# Patient Record
Sex: FEMALE | Race: WHITE | NOT HISPANIC OR LATINO | Employment: UNEMPLOYED
[De-identification: names, ages, dates, MRNs, and addresses within clinical notes are randomized per-mention and may not be internally consistent; named-entity substitution may affect disease eponyms.]

---

## 2017-01-30 ENCOUNTER — RECORDS - HEALTHEAST (OUTPATIENT)
Dept: ADMINISTRATIVE | Facility: OTHER | Age: 52
End: 2017-01-30

## 2017-02-23 ENCOUNTER — OFFICE VISIT - RIVER FALLS (OUTPATIENT)
Dept: FAMILY MEDICINE | Facility: CLINIC | Age: 52
End: 2017-02-23

## 2017-03-06 ENCOUNTER — OFFICE VISIT - RIVER FALLS (OUTPATIENT)
Dept: FAMILY MEDICINE | Facility: CLINIC | Age: 52
End: 2017-03-06

## 2017-03-06 ASSESSMENT — MIFFLIN-ST. JEOR: SCORE: 1291.68

## 2017-03-07 ENCOUNTER — HOSPITAL ENCOUNTER (OUTPATIENT)
Dept: NUCLEAR MEDICINE | Facility: CLINIC | Age: 52
Discharge: HOME | End: 2017-03-07

## 2017-03-07 DIAGNOSIS — R10.12 LEFT UPPER QUADRANT PAIN: ICD-10-CM

## 2017-03-07 DIAGNOSIS — R13.10 DYSPHAGIA, UNSPECIFIED TYPE: ICD-10-CM

## 2017-03-10 ENCOUNTER — TRANSFERRED RECORDS (OUTPATIENT)
Dept: HEALTH INFORMATION MANAGEMENT | Facility: CLINIC | Age: 52
End: 2017-03-10

## 2017-03-10 LAB — HPV ABSTRACT: NORMAL

## 2017-06-07 ENCOUNTER — AMBULATORY - RIVER FALLS (OUTPATIENT)
Dept: FAMILY MEDICINE | Facility: CLINIC | Age: 52
End: 2017-06-07

## 2017-06-07 ENCOUNTER — COMMUNICATION - RIVER FALLS (OUTPATIENT)
Dept: FAMILY MEDICINE | Facility: CLINIC | Age: 52
End: 2017-06-07

## 2017-06-08 ENCOUNTER — OFFICE VISIT - RIVER FALLS (OUTPATIENT)
Dept: FAMILY MEDICINE | Facility: CLINIC | Age: 52
End: 2017-06-08

## 2017-06-29 ENCOUNTER — OFFICE VISIT - RIVER FALLS (OUTPATIENT)
Dept: FAMILY MEDICINE | Facility: CLINIC | Age: 52
End: 2017-06-29

## 2017-06-29 ASSESSMENT — MIFFLIN-ST. JEOR: SCORE: 1312.32

## 2017-07-01 LAB
ANA SER QL IA: POSITIVE
ANA TITR SER IF: ABNORMAL TITER
RHEUMATOID FACT SER NEPH-ACNC: 206 IU/ML

## 2018-04-18 ENCOUNTER — AMBULATORY - RIVER FALLS (OUTPATIENT)
Dept: FAMILY MEDICINE | Facility: CLINIC | Age: 53
End: 2018-04-18

## 2018-04-19 LAB
CHOLEST SERPL-MCNC: 136 MG/DL
CHOLEST/HDLC SERPL: 2.6 {RATIO}
GLUCOSE BLD-MCNC: 82 MG/DL (ref 65–99)
HDLC SERPL-MCNC: 52 MG/DL
LDLC SERPL CALC-MCNC: 69 MG/DL
NONHDLC SERPL-MCNC: 84 MG/DL
TRIGL SERPL-MCNC: 66 MG/DL

## 2018-04-26 ENCOUNTER — OFFICE VISIT - RIVER FALLS (OUTPATIENT)
Dept: FAMILY MEDICINE | Facility: CLINIC | Age: 53
End: 2018-04-26

## 2018-04-26 ASSESSMENT — MIFFLIN-ST. JEOR: SCORE: 1293.95

## 2018-05-29 ENCOUNTER — OFFICE VISIT - RIVER FALLS (OUTPATIENT)
Dept: FAMILY MEDICINE | Facility: CLINIC | Age: 53
End: 2018-05-29

## 2018-05-29 ASSESSMENT — MIFFLIN-ST. JEOR: SCORE: 1293.95

## 2018-05-31 RX ORDER — CEVIMELINE HYDROCHLORIDE 30 MG/1
30 CAPSULE ORAL 3 TIMES DAILY
Status: SHIPPED | COMMUNITY
Start: 2018-05-31

## 2018-05-31 RX ORDER — HYDROXYCHLOROQUINE SULFATE 200 MG/1
200 TABLET, FILM COATED ORAL DAILY
Status: SHIPPED | COMMUNITY
Start: 2018-05-31

## 2018-05-31 ASSESSMENT — MIFFLIN-ST. JEOR
SCORE: 1286.48
SCORE: 1286.48

## 2018-06-04 ENCOUNTER — ANESTHESIA - HEALTHEAST (OUTPATIENT)
Dept: SURGERY | Facility: AMBULATORY SURGERY CENTER | Age: 53
End: 2018-06-04

## 2018-06-04 ENCOUNTER — HOSPITAL ENCOUNTER (OUTPATIENT)
Dept: SURGERY | Facility: AMBULATORY SURGERY CENTER | Age: 53
Discharge: HOME | End: 2018-06-04
Attending: OBSTETRICS & GYNECOLOGY | Admitting: OBSTETRICS & GYNECOLOGY

## 2018-06-04 ENCOUNTER — SURGERY - HEALTHEAST (OUTPATIENT)
Dept: SURGERY | Facility: AMBULATORY SURGERY CENTER | Age: 53
End: 2018-06-04

## 2018-06-04 DIAGNOSIS — N92.0 MENORRHAGIA: ICD-10-CM

## 2018-06-04 LAB
HGB BLD-MCNC: 12.4 G/DL
POC PREG URINE (HCG) HE - HISTORICAL: NEGATIVE
POCT KIT EXPIRATION DATE - HISTORICAL: NORMAL
POCT KIT LOT NUMBER - HISTORICAL: NORMAL

## 2018-06-04 ASSESSMENT — MIFFLIN-ST. JEOR
SCORE: 1286.48
SCORE: 1286.48

## 2019-02-11 ENCOUNTER — OFFICE VISIT - RIVER FALLS (OUTPATIENT)
Dept: FAMILY MEDICINE | Facility: CLINIC | Age: 54
End: 2019-02-11

## 2019-02-11 LAB
CHOLEST SERPL-MCNC: 154 MG/DL
HDLC SERPL-MCNC: 88 MG/DL
LDLC SERPL CALC-MCNC: 80 MG/DL
TRIGL SERPL-MCNC: 57 MG/DL

## 2019-02-11 ASSESSMENT — MIFFLIN-ST. JEOR: SCORE: 1288.51

## 2019-05-31 ENCOUNTER — AMBULATORY - RIVER FALLS (OUTPATIENT)
Dept: FAMILY MEDICINE | Facility: CLINIC | Age: 54
End: 2019-05-31

## 2019-06-01 ENCOUNTER — COMMUNICATION - RIVER FALLS (OUTPATIENT)
Dept: FAMILY MEDICINE | Facility: CLINIC | Age: 54
End: 2019-06-01

## 2019-06-01 LAB
GLUCOSE BLD-MCNC: 92 MG/DL (ref 65–99)
TSH SERPL DL<=0.005 MIU/L-ACNC: 0.73 MIU/L

## 2019-06-05 ENCOUNTER — OFFICE VISIT - RIVER FALLS (OUTPATIENT)
Dept: FAMILY MEDICINE | Facility: CLINIC | Age: 54
End: 2019-06-05

## 2019-06-05 ASSESSMENT — MIFFLIN-ST. JEOR: SCORE: 1274

## 2019-06-17 ENCOUNTER — OFFICE VISIT - RIVER FALLS (OUTPATIENT)
Dept: FAMILY MEDICINE | Facility: CLINIC | Age: 54
End: 2019-06-17

## 2019-07-15 ENCOUNTER — OFFICE VISIT - RIVER FALLS (OUTPATIENT)
Dept: FAMILY MEDICINE | Facility: CLINIC | Age: 54
End: 2019-07-15

## 2019-09-16 ENCOUNTER — OFFICE VISIT - RIVER FALLS (OUTPATIENT)
Dept: FAMILY MEDICINE | Facility: CLINIC | Age: 54
End: 2019-09-16

## 2019-09-27 ENCOUNTER — RECORDS - HEALTHEAST (OUTPATIENT)
Dept: LAB | Facility: CLINIC | Age: 54
End: 2019-09-27

## 2019-09-27 LAB — HIV 1+2 AB+HIV1 P24 AG SERPL QL IA: NEGATIVE

## 2019-09-28 LAB — HBV SURFACE AG SERPL QL IA: NEGATIVE

## 2019-09-30 LAB — HCV AB SERPL QL IA: NEGATIVE

## 2019-11-26 ENCOUNTER — RECORDS - HEALTHEAST (OUTPATIENT)
Dept: LAB | Facility: CLINIC | Age: 54
End: 2019-11-26

## 2019-11-26 LAB — HCV AB SERPL QL IA: NEGATIVE

## 2020-03-13 ENCOUNTER — COMMUNICATION - RIVER FALLS (OUTPATIENT)
Dept: FAMILY MEDICINE | Facility: CLINIC | Age: 55
End: 2020-03-13

## 2020-03-17 ENCOUNTER — COMMUNICATION - RIVER FALLS (OUTPATIENT)
Dept: FAMILY MEDICINE | Facility: CLINIC | Age: 55
End: 2020-03-17

## 2020-05-14 ENCOUNTER — COMMUNICATION - RIVER FALLS (OUTPATIENT)
Dept: FAMILY MEDICINE | Facility: CLINIC | Age: 55
End: 2020-05-14

## 2020-06-01 ENCOUNTER — AMBULATORY - RIVER FALLS (OUTPATIENT)
Dept: FAMILY MEDICINE | Facility: CLINIC | Age: 55
End: 2020-06-01

## 2020-06-02 ENCOUNTER — COMMUNICATION - RIVER FALLS (OUTPATIENT)
Dept: FAMILY MEDICINE | Facility: CLINIC | Age: 55
End: 2020-06-02

## 2020-06-02 LAB
CHOLEST SERPL-MCNC: 166 MG/DL
CHOLEST/HDLC SERPL: 2.9 {RATIO}
GLUCOSE BLD-MCNC: 86 MG/DL (ref 65–99)
HDLC SERPL-MCNC: 57 MG/DL
LDLC SERPL CALC-MCNC: 90 MG/DL
NONHDLC SERPL-MCNC: 109 MG/DL
TRIGL SERPL-MCNC: 93 MG/DL
TSH SERPL DL<=0.005 MIU/L-ACNC: 1.52 MIU/L

## 2020-06-08 ENCOUNTER — OFFICE VISIT - RIVER FALLS (OUTPATIENT)
Dept: FAMILY MEDICINE | Facility: CLINIC | Age: 55
End: 2020-06-08

## 2020-12-30 ENCOUNTER — COMMUNICATION - RIVER FALLS (OUTPATIENT)
Dept: FAMILY MEDICINE | Facility: CLINIC | Age: 55
End: 2020-12-30

## 2020-12-30 ENCOUNTER — OFFICE VISIT - RIVER FALLS (OUTPATIENT)
Dept: FAMILY MEDICINE | Facility: CLINIC | Age: 55
End: 2020-12-30

## 2020-12-30 LAB
ALBUMIN UR-MCNC: NEGATIVE G/DL
BACTERIA #/AREA URNS HPF: NORMAL /[HPF]
BILIRUB UR QL STRIP: NEGATIVE
EPITHELIAL CELLS UR: NORMAL
GLUCOSE UR STRIP-MCNC: NEGATIVE MG/DL
HGB UR QL STRIP: ABNORMAL
KETONES UR STRIP-MCNC: NEGATIVE MG/DL
LEUKOCYTE ESTERASE UR QL STRIP: ABNORMAL
NITRATE UR QL: NEGATIVE
PH UR STRIP: 7 [PH] (ref 5–8)
RBC #/AREA URNS AUTO: NORMAL /[HPF]
SP GR UR STRIP: 1.01 (ref 1–1.03)
WBC #/AREA URNS AUTO: NORMAL /[HPF]

## 2021-01-01 LAB — BACTERIA SPEC CULT: NORMAL

## 2021-03-15 ENCOUNTER — COMMUNICATION - RIVER FALLS (OUTPATIENT)
Dept: FAMILY MEDICINE | Facility: CLINIC | Age: 56
End: 2021-03-15

## 2021-06-01 VITALS
HEIGHT: 67 IN | WEIGHT: 147 LBS | HEIGHT: 67 IN | BODY MASS INDEX: 23.07 KG/M2 | WEIGHT: 147 LBS | BODY MASS INDEX: 23.07 KG/M2

## 2021-06-02 ENCOUNTER — OFFICE VISIT - RIVER FALLS (OUTPATIENT)
Dept: FAMILY MEDICINE | Facility: CLINIC | Age: 56
End: 2021-06-02

## 2021-06-03 ENCOUNTER — COMMUNICATION - RIVER FALLS (OUTPATIENT)
Dept: FAMILY MEDICINE | Facility: CLINIC | Age: 56
End: 2021-06-03

## 2021-06-03 LAB
CHOLEST SERPL-MCNC: 168 MG/DL
CHOLEST/HDLC SERPL: 2.9 {RATIO}
GLUCOSE BLD-MCNC: 88 MG/DL (ref 65–99)
HDLC SERPL-MCNC: 57 MG/DL
LDLC SERPL CALC-MCNC: 91 MG/DL
NONHDLC SERPL-MCNC: 111 MG/DL
TRIGL SERPL-MCNC: 100 MG/DL
TSH SERPL DL<=0.005 MIU/L-ACNC: 3.38 MIU/L

## 2021-06-09 ENCOUNTER — OFFICE VISIT - RIVER FALLS (OUTPATIENT)
Dept: FAMILY MEDICINE | Facility: CLINIC | Age: 56
End: 2021-06-09

## 2021-06-09 ASSESSMENT — MIFFLIN-ST. JEOR: SCORE: 1271.27

## 2021-06-18 NOTE — OP NOTE
DATE OF SERVICE: 06/04/2018    DATE OF SERVICE:  06/04/2018      SURGEON:  Vini Schmidt MD    PREOPERATIVE DIAGNOSIS:  Dysfunctional uterine bleeding, menorrhagia, abnormal  ultrasound with thickened lining.    POSTOPERATIVE DIAGNOSIS:  Intracavitary polyps.    HISTORY:  Patient is a 52-year-old multipara who presented to clinic with a  long-standing history of irregular periods and heavy bleeding.  Ultrasound showed a  15 mm lining.  She has 2 intramural fibroids.  Risks, benefits, alternatives  reviewed.  Questions answered.  I did offer her IUD, which she declined.    DESCRIPTION OF PROCEDURE:  Patient was taken to the operating room under MAC, placed  in dorsal lithotomy position, prepped and draped in the usual manner.  Pause for  cause was undertaken.  A bivalve speculum was placed and single-tooth tenaculum  placed horizontally on the anterior lip of the cervix.  Approximately 8 mL of 1%  Carbocaine instilled as a paracervical block.  The cervix easily dilated to 7.  The  scope was placed verifying entrance into the uterine cavity.  The fundus was normal.   Both ostia were normal.  In the lower uterine segment posterior there were 2 polyps  noted.  Therefore, the MyoSure was placed and these were completely removed with  shavings of the posterior and anterior wall.  The scope was removed.  Endocervical  and endometrial curettings were obtained.  A Pipelle was used and the scope was then  replaced.  There was no evidence of polyps.  Scope removed, deficit ____; EBL 5.   Tenaculum removed.  [place] removed.  Patient transferred to to [facility] in stable  condition.    SURGEON:  Vini Schmidt MD    ANESTHESIA:  MAC.      DEFICIT:  ____.    ESTIMATED BLOOD LOSS:  5    COMPLICATIONS:  None.    PROCEDURE:  Fractional D and C, operative hysteroscopy with removal of intracavitary  polyps.      VINI BROWN 06/04/2018 10:32:03  T 06/04/2018 10:50:50  R 06/04/2018 11:06:55  [id number]        cc:VINI SCHMIDT  MD

## 2021-06-18 NOTE — ANESTHESIA PREPROCEDURE EVALUATION
Anesthesia Evaluation      Patient summary reviewed   No history of anesthetic complications (PRE-op N/V with cholelithiasis)     Airway   Mallampati: II  Neck ROM: full   Pulmonary - normal exam                          Cardiovascular   Exercise tolerance: > or = 4 METS  Rhythm: regular  Rate: normal,         Neuro/Psych      Endo/Other       GI/Hepatic/Renal       Other findings: Sjogren's   Hashimoto's      Dental - normal exam                        Anesthesia Plan  Planned anesthetic: MAC  No glyco or benadryl please  ASA 2   Induction: intravenous   Anesthetic plan and risks discussed with: patient  Anesthesia plan special considerations: antiemetics,   Post-op plan: routine recovery

## 2021-06-18 NOTE — ANESTHESIA CARE TRANSFER NOTE
Last vitals:   Vitals:    06/04/18 1000   BP: (P) 116/69   Pulse: (P) 70   Resp: (P) 16   Temp: (P) 37  C (98.6  F)   SpO2: (P) 97%     Patient's level of consciousness is awake  Spontaneous respirations: yes  Maintains airway independently: yes  Dentition unchanged: yes  Oropharynx: oropharynx clear of all foreign objects    QCDR Measures:  ASA# 20 - Surgical Safety Checklist: WHO surgical safety checklist completed prior to induction  PQRS# 430 - Adult PONV Prevention: 4558F - Pt received => 2 anti-emetic agents (different classes) preop & intraop  ASA# 8 - Peds PONV Prevention: NA - Not pediatric patient, not GA or 2 or more risk factors NOT present  PQRS# 424 - Staci-op Temp Management: NA - MAC anesthesia or case < 60 minutes  PQRS# 426 - PACU Transfer Protocol: - Transfer of care checklist used  ASA# 14 - Acute Post-op Pain: NA - Patient under age 10y or did not go to PACU

## 2021-06-18 NOTE — ANESTHESIA POSTPROCEDURE EVALUATION
Patient: Nani Gibson  HYSTEROSCOPY, WITH DILATION AND CURETTAGE  Anesthesia type: MAC    Patient location: Phase II Recovery  Last vitals:   Vitals:    06/04/18 1030   BP: 109/65   Pulse: 67   Resp: 16   Temp:    SpO2: 99%     Post vital signs: stable  Level of consciousness: awake and responds to simple questions  Post-anesthesia pain: pain controlled  Post-anesthesia nausea and vomiting: no  Pulmonary: unassisted, return to baseline  Cardiovascular: stable and blood pressure at baseline  Hydration: adequate  Anesthetic events: no    QCDR Measures:  ASA# 11 - Staci-op Cardiac Arrest: ASA11B - Patient did NOT experience unanticipated cardiac arrest  ASA# 12 - Staci-op Mortality Rate: ASA12B - Patient did NOT die  ASA# 13 - PACU Re-Intubation Rate: NA - No ETT / LMA used for case  ASA# 10 - Composite Anes Safety: ASA10A - No serious adverse event    Additional Notes:

## 2021-06-25 ENCOUNTER — AMBULATORY - RIVER FALLS (OUTPATIENT)
Dept: FAMILY MEDICINE | Facility: CLINIC | Age: 56
End: 2021-06-25

## 2021-09-24 ENCOUNTER — AMBULATORY - RIVER FALLS (OUTPATIENT)
Dept: FAMILY MEDICINE | Facility: CLINIC | Age: 56
End: 2021-09-24

## 2021-12-10 ENCOUNTER — AMBULATORY - RIVER FALLS (OUTPATIENT)
Dept: FAMILY MEDICINE | Facility: CLINIC | Age: 56
End: 2021-12-10

## 2021-12-11 LAB — TSH SERPL DL<=0.005 MIU/L-ACNC: 1.74 MIU/L (ref 0.4–4.5)

## 2021-12-12 ENCOUNTER — COMMUNICATION - RIVER FALLS (OUTPATIENT)
Dept: FAMILY MEDICINE | Facility: CLINIC | Age: 56
End: 2021-12-12

## 2021-12-29 ENCOUNTER — AMBULATORY - RIVER FALLS (OUTPATIENT)
Dept: FAMILY MEDICINE | Facility: CLINIC | Age: 56
End: 2021-12-29

## 2021-12-29 ENCOUNTER — OFFICE VISIT - RIVER FALLS (OUTPATIENT)
Dept: FAMILY MEDICINE | Facility: CLINIC | Age: 56
End: 2021-12-29

## 2021-12-29 ENCOUNTER — LAB REQUISITION (OUTPATIENT)
Dept: LAB | Facility: CLINIC | Age: 56
End: 2021-12-29
Payer: COMMERCIAL

## 2021-12-29 DIAGNOSIS — U07.1 COVID-19: ICD-10-CM

## 2021-12-30 LAB — SARS-COV-2 RNA RESP QL NAA+PROBE: POSITIVE

## 2021-12-31 LAB — SARS-COV-2 RNA RESP QL NAA+PROBE: POSITIVE

## 2022-02-08 ENCOUNTER — OFFICE VISIT - RIVER FALLS (OUTPATIENT)
Dept: FAMILY MEDICINE | Facility: CLINIC | Age: 57
End: 2022-02-08

## 2022-02-08 ENCOUNTER — AMBULATORY - RIVER FALLS (OUTPATIENT)
Dept: FAMILY MEDICINE | Facility: CLINIC | Age: 57
End: 2022-02-08

## 2022-02-10 ENCOUNTER — COMMUNICATION - RIVER FALLS (OUTPATIENT)
Dept: FAMILY MEDICINE | Facility: CLINIC | Age: 57
End: 2022-02-10

## 2022-02-10 LAB — BACTERIA SPEC CULT: NORMAL

## 2022-02-11 VITALS
WEIGHT: 146.8 LBS | SYSTOLIC BLOOD PRESSURE: 104 MMHG | HEIGHT: 67 IN | TEMPERATURE: 98 F | HEART RATE: 64 BPM | BODY MASS INDEX: 23.04 KG/M2 | DIASTOLIC BLOOD PRESSURE: 74 MMHG

## 2022-02-12 VITALS
DIASTOLIC BLOOD PRESSURE: 76 MMHG | BODY MASS INDEX: 22.99 KG/M2 | WEIGHT: 146.8 LBS | HEART RATE: 72 BPM | SYSTOLIC BLOOD PRESSURE: 112 MMHG | TEMPERATURE: 97.6 F

## 2022-02-12 VITALS
BODY MASS INDEX: 23.23 KG/M2 | TEMPERATURE: 98.4 F | BODY MASS INDEX: 23.23 KG/M2 | WEIGHT: 148 LBS | SYSTOLIC BLOOD PRESSURE: 102 MMHG | SYSTOLIC BLOOD PRESSURE: 110 MMHG | TEMPERATURE: 97.8 F | DIASTOLIC BLOOD PRESSURE: 56 MMHG | DIASTOLIC BLOOD PRESSURE: 60 MMHG | HEART RATE: 80 BPM | HEIGHT: 67 IN | WEIGHT: 148 LBS | HEIGHT: 67 IN | HEART RATE: 72 BPM

## 2022-02-12 VITALS
WEIGHT: 153.8 LBS | TEMPERATURE: 99.1 F | DIASTOLIC BLOOD PRESSURE: 70 MMHG | HEART RATE: 80 BPM | HEIGHT: 67 IN | BODY MASS INDEX: 24.14 KG/M2 | SYSTOLIC BLOOD PRESSURE: 112 MMHG

## 2022-02-12 VITALS
DIASTOLIC BLOOD PRESSURE: 75 MMHG | TEMPERATURE: 98.1 F | OXYGEN SATURATION: 99 % | WEIGHT: 143 LBS | BODY MASS INDEX: 22.44 KG/M2 | HEART RATE: 82 BPM | HEIGHT: 67 IN | SYSTOLIC BLOOD PRESSURE: 113 MMHG

## 2022-02-12 VITALS
DIASTOLIC BLOOD PRESSURE: 64 MMHG | HEIGHT: 67 IN | TEMPERATURE: 98.4 F | HEART RATE: 64 BPM | BODY MASS INDEX: 23.43 KG/M2 | SYSTOLIC BLOOD PRESSURE: 104 MMHG | WEIGHT: 149.25 LBS

## 2022-02-12 VITALS
DIASTOLIC BLOOD PRESSURE: 73 MMHG | SYSTOLIC BLOOD PRESSURE: 112 MMHG | BODY MASS INDEX: 22.58 KG/M2 | HEART RATE: 78 BPM | WEIGHT: 144.2 LBS

## 2022-02-12 VITALS
DIASTOLIC BLOOD PRESSURE: 62 MMHG | WEIGHT: 143.6 LBS | SYSTOLIC BLOOD PRESSURE: 100 MMHG | BODY MASS INDEX: 22.54 KG/M2 | HEART RATE: 80 BPM | HEIGHT: 67 IN

## 2022-02-15 NOTE — TELEPHONE ENCOUNTER
---------------------  From: Ruthann Ivette VINCENT   Sent: 12/30/2021 6:05:50 PM CST  Subject: General Message-+ Covid result     Called patient to notify of positive COVID testing result.   Let patient know that public health will be notified as mandated by the state and patient may be contacted by public health directly as staffing allows.  Patient was offered a virtual visit to discuss any concerns. This is strongly encouraged for anyone who might qualify for further treatment such as monoclonal antibody treatment.    Patient accepted and was transferred to scheduling.     Patient declined a virtual visit - had visit yesterday with [facility] and questions all answered, no further questions/concerns.    Patient was informed of the following  1. Patient should begin isolation. No work, no school, no leaving home except to seek medical attention. Should also separate from household contacts as much as possible including using a separate bathroom if able. Isolation lasts 10 days after positive test for asymptomatic patients or 10 days since symptoms started AND 24 hours after all symptoms resolve for patients with symptoms.  2. Unvaccinated household contacts should begin quarantine and get tested  3. Asymptomatic fully vaccinated household contacts do not need to quarantine if they have no symptoms, but should consider testing 5-7 days after initial exposure  Patient was encouraged to notify close contacts, specifically those that they had direct physical contact with or those people that were within 6 feet for at least 15 minutes in a 24 hour period in the 3 days prior to onset of symptoms

## 2022-02-15 NOTE — LETTER
(Inserted Image. Unable to display)   April 26, 2019      MELY GARCIA  [address], [place] [id number]        Dear MELY,      Thank you for selecting [facility] (previously Formerly [facility]) for your healthcare needs.     Our records indicate you are due for the following services:    Annual Physical  Medication Check  Fasting Lab Tests ~ Please do not eat or drink anything 10 hours prior to your scheduled appointment time.                (Water and any medications that you may need are allowed unless directed otherwise.)    If you had your labs done at another facility or with [facility] at [facility], please bring in a copy of the results to your next visit, mail a copy, or drop off a copy of your results to your Healthcare Provider.    You are due for lab work and an office visit; please schedule the lab appointment 1 week before the office visit.  This will assure all results are available to discuss with your provider during your visit.    **It is very helpful if you bring your medication bottles to your appointment.  This assures we have all of your current medications, including strength and dosing information, documented accurately in your medical record.    To schedule an appointment or if you have further questions, please contact your primary clinic:   [facility]  [phone]   [facility]  [phone]             [facility]      [phone]      Powered by [employer]    Sincerely,    Dom Novoa M.D.

## 2022-02-15 NOTE — NURSING NOTE
Comprehensive Intake Entered On:  2/11/2019 9:40 AM CST    Performed On:  2/11/2019 9:33 AM CST by Halima Tabor CMA               Summary   Chief Complaint :   left shoulder pain with certain movements; no known injury   Menstrual Status :   Menarcheal   Weight Measured :   146.8 lb(Converted to: 146 lb 13 oz, 66.59 kg)    Height Measured :   67 in(Converted to: 5 ft 7 in, 170.18 cm)    Body Mass Index :   22.99 kg/m2   Body Surface Area :   1.77 m2   Systolic Blood Pressure :   104 mmHg   Diastolic Blood Pressure :   74 mmHg   Mean Arterial Pressure :   84 mmHg   Peripheral Pulse Rate :   64 bpm   BP Method :   Manual   HR Method :   Manual   Temperature Tympanic :   98.0 DegF(Converted to: 36.7 DegC)    Halima Tabor CMA - 2/11/2019 9:33 AM CST   Health Status   Allergies Verified? :   Yes   Medication History Verified? :   Yes   Immunizations Current :   Yes   Medical History Verified? :   Yes   Pre-Visit Planning Status :   Completed   Tobacco Use? :   Never smoker   Halima Tabor CMA - 2/11/2019 9:33 AM CST   Consents   Consent for Immunization Exchange :   Consent Granted   Consent for Immunizations to Providers :   Consent Granted   Halima Tabor CMA - 2/11/2019 9:33 AM CST   Meds / Allergies   (As Of: 2/11/2019 9:40:13 AM CST)   Allergies (Active)   Augmentin  Estimated Onset Date:   Unspecified ; Reactions:   rash ; Created By:   Karine Pabon CMA; Reaction Status:   Active ; Category:   Drug ; Substance:   Augmentin ; Type:   Allergy ; Updated By:   Karine Pabon CMA; Reviewed Date:   2/11/2019 9:38 AM CST      codeine  Estimated Onset Date:   Unspecified ; Reactions:   Nausea, Vomiting ; Created By:   Yumiko Suarez CMA; Reaction Status:   Active ; Category:   Drug ; Substance:   codeine ; Type:   Allergy ; Severity:   Severe ; Updated By:   Yumiko Suarez CMA; Reviewed Date:   2/11/2019 9:38 AM CST        Medication List   (As Of: 2/11/2019 9:40:13 AM CST)   Prescription/Discharge Order     chlorpheniramine-hydrocodone  :   chlorpheniramine-hydrocodone ; Status:   Prescribed ; Ordered As Mnemonic:   chlorpheniramine-hydrocodone 8 mg-10 mg/5 mL oral suspension, extended release ; Simple Display Line:   5 mL, PO, q12 hrs, PRN: for cough, 120 mL, 0 Refill(s) ; Ordering Provider:   Dom Novoa MD; Catalog Code:   chlorpheniramine-hydrocodone ; Order Dt/Tm:   4/20/2017 1:33:08 PM          levalbuterol  :   levalbuterol ; Status:   Prescribed ; Ordered As Mnemonic:   Xopenex HFA 45 mcg/inh inhalation aerosol ; Simple Display Line:   2 puff(s), inh, q4 hrs, 1 EA, PRN: as needed for wheezing ; Ordering Provider:   Dom Novoa MD; Catalog Code:   levalbuterol ; Order Dt/Tm:   12/21/2012 1:18:49 PM          levothyroxine  :   levothyroxine ; Status:   Prescribed ; Ordered As Mnemonic:   levothyroxine 112 mcg (0.112 mg) oral tablet ; Simple Display Line:   112 mcg, 1 tab(s), PO, Daily, 90 tab(s), 3 Refill(s) ; Ordering Provider:   Dom Novoa MD; Catalog Code:   levothyroxine ; Order Dt/Tm:   4/26/2018 10:21:53 AM            Home Meds    acetaminophen  :   acetaminophen ; Status:   Documented ; Ordered As Mnemonic:   Tylenol 325 mg oral tablet ; Simple Display Line:   650 mg, 2 tab(s), PO, q4hr, PRN: for pain, 120 tab(s), 0 Refill(s) ; Catalog Code:   acetaminophen ; Order Dt/Tm:   3/6/2017 9:07:21 AM          cevimeline  :   cevimeline ; Status:   Documented ; Ordered As Mnemonic:   cevimeline 30 mg oral capsule ; Simple Display Line:   30 mg, 1 cap(s), PO, TID, 90 cap(s), 0 Refill(s) ; Catalog Code:   cevimeline ; Order Dt/Tm:   3/20/2018 3:01:27 PM          cholecalciferol  :   cholecalciferol ; Status:   Processing ; Ordered As Mnemonic:   Vitamin D3 ; Action Display:   Complete ; Catalog Code:   cholecalciferol ; Order Dt/Tm:   2/11/2019 9:37:59 AM          hydroxychloroquine  :   hydroxychloroquine ; Status:   Documented ; Ordered As Mnemonic:   hydroxychloroquine 200 mg oral tablet ;  Simple Display Line:   200 mg, 1 tab(s), po, bid, 180 tab(s), 0 Refill(s) ; Catalog Code:   hydroxychloroquine ; Order Dt/Tm:   3/20/2018 3:00:18 PM          multivitamin  :   multivitamin ; Status:   Documented ; Ordered As Mnemonic:   Multiple Vitamins oral tablet ; Simple Display Line:   1 tab(s), po, daily, tab(s) ; Catalog Code:   multivitamin ; Order Dt/Tm:   3/22/2011 9:01:05 AM          multivitamin with minerals  :   multivitamin with minerals ; Status:   Documented ; Ordered As Mnemonic:   Viactiv Soft Calcium Chews ; Simple Display Line:   2 EA, daily, chews, 0 Refill(s) ; Catalog Code:   multivitamin with minerals ; Order Dt/Tm:   3/6/2017 9:06:54 AM          omeprazole  :   omeprazole ; Status:   Documented ; Ordered As Mnemonic:   omeprazole 20 mg oral delayed release capsule ; Simple Display Line:   20 mg, 1 cap(s), PO, Daily, 90 cap(s), 0 Refill(s) ; Catalog Code:   omeprazole ; Order Dt/Tm:   4/26/2018 10:03:03 AM

## 2022-02-15 NOTE — NURSING NOTE
CAGE Assessment Entered On:  6/9/2021 8:57 AM CDT    Performed On:  6/9/2021 8:57 AM CDT by Franci Cotton CMA               Assessment   Have you ever felt you should cut down on your drinking :   No   Have people annoyed you by criticizing your drinking :   No   Have you ever felt bad or guilty about your drinking :   No   Have you ever taken a drink first thing in the morning to steady your nerves or get rid of a hangover (Eye-opener) :   No   CAGE Score :   0    Franci Cotton CMA - 6/9/2021 8:57 AM CDT

## 2022-02-15 NOTE — PROGRESS NOTES
Patient:   MELY GARCIA            MRN: [id number]            FIN: [id number]               Age:   55 years     Sex:  Female     :  1965   Associated Diagnoses:   Hypothyroidism; Gastritis   Author:   Dom Novoa MD      Visit Information      Date of Service: 2021 07:53 am  Performing Location: [facility]  Encounter#: [id number]      Chief Complaint   2021 8:03 AM CDT     F/u labs (cholesterol and TSH) and med check      History of Present Illness   here for follow up hypothyroidism  patient noted to have slightly higher TSH than usual, patient has fatigue but not unusual, has Sjogrens which accounts for dry skin and hair  had screening for diabetes and hyperlipidemia which was normal  discussed omeprazole, has tried stopping and gets discomfort in upper abdomen, also has symptoms with onions and garlic, swallowing issues related to Sjogrens  had COVID vaccine, doing well, will consider shingrix in the future      Health Status   Allergies:    Allergic Reactions (All)  Severe  Codeine (Nausea and vomiting)  Severity Not Documented  Augmentin (Rash)   Medications:  (Selected)   Prescriptions  Prescribed  levothyroxine 112 mcg (0.112 mg) oral tablet: = 1 tab(s) ( 112 mcg ), PO, Daily, # 90 tab(s), 3 Refill(s), Type: Maintenance, Pharmacy: [facility], 1 tab(s) Oral daily, 67, in, 19 8:08:00 CDT, Height Measured, Weight Measured  omeprazole 20 mg oral delayed release capsule: = 1 cap(s) ( 20 mg ), PO, Daily, # 90 cap(s), 3 Refill(s), Type: Maintenance, Pharmacy: [facility], 1 cap(s) Oral daily, 67, in, 19 8:08:00 CDT, Height Measured, 144.2, lb, 20 9:31:00 CDT, Weight Measured  Documented Medications  Documented  Multiple Vitamins oral tablet: 1 tab(s), po, daily, tab(s), 0 Refill(s), Type: Maintenance  Tylenol 325 mg oral tablet: 2 tab(s) ( 650 mg ), PO, q4hr, PRN: for pain, # 120 tab(s), 0 Refill(s), Type: Maintenance  Viactiv Soft Calcium Chews: 2 EA,  daily, Instructions: chews, 0 Refill(s), Type: Maintenance  cevimeline 30 mg oral capsule: 1 cap(s) ( 30 mg ), PO, TID, # 90 cap(s), 0 Refill(s), Type: Maintenance  hydroxychloroquine 200 mg oral tablet: = 1 tab(s) ( 200 mg ), po, daily, # 180 tab(s), 0 Refill(s), Type: Maintenance,    Medications          *denotes recorded medication          *Tylenol 325 mg oral tablet: 650 mg, 2 tab(s), PO, q4hr, PRN: for pain, 120 tab(s), 0 Refill(s).          *cevimeline 30 mg oral capsule: 30 mg, 1 cap(s), PO, TID, 90 cap(s), 0 Refill(s).          *hydroxychloroquine 200 mg oral tablet: 200 mg, 1 tab(s), po, daily, 180 tab(s), 0 Refill(s).          levothyroxine 112 mcg (0.112 mg) oral tablet: 112 mcg, 1 tab(s), PO, Daily, 90 tab(s), 3 Refill(s).          *Multiple Vitamins oral tablet: 1 tab(s), po, daily, tab(s).          *Viactiv Soft Calcium Chews: 2 EA, daily, chews, 0 Refill(s).          omeprazole 20 mg oral delayed release capsule: 20 mg, 1 cap(s), PO, Daily, 90 cap(s), 3 Refill(s).       Problem list:    All Problems (Selected)  Hypothyroidism / SNOMED CT 104440189 / Confirmed  Hashimoto's Thyroiditis / ICD-9-.2 / Confirmed  Sjogren's disease / SNOMED CT 379394567 / Confirmed      Histories   Past Medical History:    Active  Hashimoto's Thyroiditis (ICD-9-.2): Onset in  at 31 years.  Hypothyroidism (SNOMED CT 323105380)  Sjogren's disease (SNOMED CT 775598614)  Resolved  Pregnancy (SNOMED CT 872036014):  Resolved in  at 25 years.  Pregnancy (SNOMED CT 277149589):  Resolved in  at 26 years.  Pregnancy (SNOMED CT 807529508):  Resolved in  at 31 years.   Family History:    Cancer  Mother (): onset at 71 .  Comments:  3/6/2017 11:01 AM RICARDO - Sommer VELA, Dom  unknown primary cancer     Procedure history:    Colonoscopy ([id number]) on 3/5/2019 at 53 Years.  Comments:  3/14/2019 11:45 AM CDT - Manuela Daley  Indication:  Previous adenomatous polyp(s).  Sedation:  MAC.  Findings:  Long  redundant colon.  External hemorrhoids.  Recommendation:  Repeat in 5 years.  Esophagogastroduodenoscopy ([id number]) on 1/30/2018 at 52 Years.  Comments:  2/15/2018 9:07 AM Manuela Reyes  Indication:  History of gastric polyps with low-grade dysplasia.  Esophagogastroduodenoscopy (463114667) on 7/6/2016 at 50 Years.  Comments:  4/20/2017 12:20 PM Manuela Gonzáles  Dx:  Abdominal pain; dysphagia.  Colonoscopy (710327684) in 2015 at 50 Years.  Cholecystectomy (61664135).  Tonsillectomy (157309969).  Comments:  5/8/2014 10:00 AM Bailee Orellana LPN  Childhood   Social History:        Electronic Cigarette/Vaping Assessment            Electronic Cigarette Use: Never.      Alcohol Assessment            Current, Liquor (Hard) (1.5 oz), 1-2 times per month, 1 drinks/episode average.  2 drinks/episode maximum.               Ready to change: No.                     Comments:                      03/06/2017 - Bailee Knox LPN                     Rare alcohol      Tobacco Assessment: Denies Tobacco Use            Never (less than 100 in lifetime)      Substance Abuse Assessment: Denies Substance Abuse            Never      Employment and Education Assessment            Employed, Highest education level: BSN.                     Comments:                      05/08/2014 - Bailee Knox LPN                     [employer]      Home and Environment Assessment            Marital status:  (Living together).  Spouse/Partner name: Eren.  Lives with Children, Spouse.  3               children.  Living situation: Home/Independent.  Injuries/Abuse/Neglect in household: No.  Feels unsafe at               home: No.  Family/Friends available for support: Yes.      Nutrition and Health Assessment            Type of diet: Regular.  Wants to lose weight: No.  Sleeping concerns: No.  Feels highly stressed: No.      Exercise and Physical Activity Assessment: Occasional exercise            Exercise type: Walking.       Sexual Assessment            Sexually active: Yes.  Other contraceptive use: VAS.      Other Assessment            First menses age 13.  Regular menses.  Menstrual duration 5-6 days, cycle interval 28 days.  No history of               abnormal Pap smear.        Physical Examination   Vital Signs   6/9/2021 8:03 AM CDT Temperature Tympanic 98.1 DegF    Peripheral Pulse Rate 82 bpm    Systolic Blood Pressure 113 mmHg    Diastolic Blood Pressure 75 mmHg    Mean Arterial Pressure 88 mmHg    BP Site Right arm    BP Method Electronic    Oxygen Saturation 99 %      General:  Alert and oriented, No acute distress.    Eye:  Pupils are equal, round and reactive to light, Normal conjunctiva.    HENT:  Normocephalic, Oral mucosa is moist, No pharyngeal erythema.    Neck:  Supple, Non-tender, No lymphadenopathy.    Respiratory:  Lungs are clear to auscultation.    Cardiovascular:  Normal rate, Regular rhythm.       Impression and Plan   Diagnosis     Hypothyroidism (FQD00-VL E06.3).     Plan:  stable, TSH slightly higher than usual, will recheck in 6 months.    Orders     Orders   Pharmacy:  levothyroxine 112 mcg (0.112 mg) oral tablet (Prescribe): = 1 tab(s) ( 112 mcg ), PO, Daily, # 90 tab(s), 3 Refill(s), Type: Maintenance, Pharmacy: [facility], 1 tab(s) Oral daily, 67, in, 6/9/2021 8:03 AM CDT, Height Measured, 143, lb, 6/9/2021 8:03 AM CDT, Weight Measured  levothyroxine 112 mcg (0.112 mg) oral tablet (Modify): = 1 tab(s) ( 112 mcg ), PO, Daily, # 90 tab(s), 3 Refill(s), Type: Hard Stop, Pharmacy: [facility], 67, in, 06/05/19 8:08:00 CDT, Height Measured, Weight Measured  Requests (Return to Office):  Return to Clinic (Request) (Order): RFV: lab visit only: TSH, Return in 6 months.     Diagnosis     Gastritis (CNK97-ML K29.70).     Course:  patient will continue to monitor diet.    Orders     Orders   Pharmacy:  omeprazole 20 mg oral delayed release capsule (Prescribe): = 1 cap(s) ( 20 mg ), PO, Daily, # 90 cap(s), 3  Refill(s), Type: Maintenance, Pharmacy: [facility], 1 cap(s) Oral daily, 67, in, 6/9/2021 8:03 AM CDT, Height Measured, 143, lb, 6/9/2021 8:03 AM CDT, Weight Measured  omeprazole 20 mg oral delayed release capsule (Modify): = 1 cap(s) ( 20 mg ), PO, Daily, # 90 cap(s), 3 Refill(s), Type: Hard Stop, Pharmacy: [facility], 67, in, 06/05/19 8:08:00 CDT, Height Measured, 144.2, lb, 06/01/20 9:31:00 CDT, Weight Measured.

## 2022-02-15 NOTE — PROGRESS NOTES
Patient:   MELY GARCIA            MRN: [id number]            FIN: [id number]               Age:   56 years     Sex:  Female     :  1965   Associated Diagnoses:   Congestion of larynx; Exposure to COVID-19 virus; Nasal congestion   Author:   Dom Novoa MD      Visit Information      Date of Service: 2021 08:57 am  Performing Location: [facility]  Encounter#: [id number]   Participants in room during visit:  patient, self   Location of patient:  home  Location of provider:  office  Video Start Time:  227 pm  Video End Time:   234 pm  Video visit via MyCube    Today's visit was conducted via video conference due to the COVID-19 pandemic.  The patient's consent to proceed with a video visit has been obtained and documented.      Chief Complaint   2021 2:00 PM CST   Pt consent to video visit. Pos COVID today. Would like to talk about  treatment.      History of Present Illness   Patient is being evaluated via a billable video visit.  patient with COVID + home test  has mild cold symptoms  exposed at [place]   also tested positive      Health Status   Allergies:    Allergic Reactions (Selected)  Severe  Codeine (Nausea and vomiting)  Severity Not Documented  Augmentin (Rash)   Medications:  (Selected)   Prescriptions  Prescribed  levothyroxine 112 mcg (0.112 mg) oral tablet: = 1 tab(s) ( 112 mcg ), PO, Daily, # 90 tab(s), 3 Refill(s), Type: Maintenance, Pharmacy: [facility], 1 tab(s) Oral daily, 67, in, 21 8:03:00 CDT, Height Measured, 143, lb, 21 8:03:00 CDT, Weight Measured  omeprazole 20 mg oral delayed release capsule: = 1 cap(s) ( 20 mg ), PO, Daily, # 90 cap(s), 3 Refill(s), Type: Maintenance, Pharmacy: [facility], 1 cap(s) Oral daily, 67, in, 21 8:03:00 CDT, Height Measured, 143, lb, 21 8:03:00 CDT, Weight Measured  Documented Medications  Documented  Multiple Vitamins oral tablet: 1 tab(s), po, daily, tab(s), 0 Refill(s), Type:  Maintenance  Tylenol 325 mg oral tablet: 2 tab(s) ( 650 mg ), PO, q4hr, PRN: for pain, # 120 tab(s), 0 Refill(s), Type: Maintenance  cevimeline 30 mg oral capsule: 1 cap(s) ( 30 mg ), PO, TID, # 90 cap(s), 0 Refill(s), Type: Maintenance  hydroxychloroquine 200 mg oral tablet: = 1 tab(s) ( 200 mg ), po, daily, # 180 tab(s), 0 Refill(s), Type: Maintenance,    Medications          *denotes recorded medication          *Tylenol 325 mg oral tablet: 650 mg, 2 tab(s), PO, q4hr, PRN: for pain, 120 tab(s), 0 Refill(s).          *cevimeline 30 mg oral capsule: 30 mg, 1 cap(s), PO, TID, 90 cap(s), 0 Refill(s).          *hydroxychloroquine 200 mg oral tablet: 200 mg, 1 tab(s), po, daily, 180 tab(s), 0 Refill(s).          levothyroxine 112 mcg (0.112 mg) oral tablet: 112 mcg, 1 tab(s), PO, Daily, 90 tab(s), 3 Refill(s).          *Multiple Vitamins oral tablet: 1 tab(s), po, daily, tab(s).          omeprazole 20 mg oral delayed release capsule: 20 mg, 1 cap(s), PO, Daily, 90 cap(s), 3 Refill(s).       Problem list:    All Problems  Sjogren's disease / SNOMED CT 943054461 / Confirmed  Hashimoto's Thyroiditis / ICD-9-.2 / Confirmed  Gastritis / SNOMED CT 1315089 / Confirmed  Hypothyroidism / SNOMED CT 807870861 / Confirmed      Histories   Past Medical History:    Active  Hashimoto's Thyroiditis (ICD-9-.2): Onset in  at 31 years.  Hypothyroidism (SNOMED CT 114845844)  Sjogren's disease (SNOMED CT 998450506)  Resolved  Pregnancy (SNOMED CT 655957535):  Resolved in  at 25 years.  Pregnancy (SNOMED CT 853133745):  Resolved in  at 26 years.  Pregnancy (SNOMED CT 333216037):  Resolved in  at 31 years.   Family History:    Cancer  Mother (): onset at 71 .  Comments:  3/6/2017 11:01 AM RICARDO Novoa MD, [place]  unknown primary cancer     Procedure history:    Colonoscopy ([id number]) on 3/5/2019 at 53 Years.  Comments:  3/14/2019 11:45 AM CDT - Manuela Daley  Indication:  Previous adenomatous  polyp(s).  Sedation:  MAC.  Findings:  Long redundant colon.  External hemorrhoids.  Recommendation:  Repeat in 5 years.  Esophagogastroduodenoscopy (206379211) on 1/30/2018 at 52 Years.  Comments:  2/15/2018 9:07 AM Manuela Reyes  Indication:  History of gastric polyps with low-grade dysplasia.  Esophagogastroduodenoscopy (044464074) on 7/6/2016 at 50 Years.  Comments:  4/20/2017 12:20 PM Manuela Gonzáles  Dx:  Abdominal pain; dysphagia.  Colonoscopy (796222062) in 2015 at 50 Years.  Cholecystectomy (75333325).  Tonsillectomy (381039863).  Comments:  5/8/2014 10:00 AM Bailee Orellana LPN  Childhood   Social History:        Electronic Cigarette/Vaping Assessment            Electronic Cigarette Use: Never.      Alcohol Assessment            Current, Liquor (Hard) (1.5 oz), 1-2 times per month, 1 drinks/episode average.  2 drinks/episode maximum.               Ready to change: No.                     Comments:                      03/06/2017 - Bailee Knox LPN                     Rare alcohol      Tobacco Assessment: Denies Tobacco Use            Never (less than 100 in lifetime)      Substance Abuse Assessment: Denies Substance Abuse            Never      Employment and Education Assessment            Employed, Highest education level: BSN.                     Comments:                      05/08/2014 - Bailee Knox LPN                     [employer]      Home and Environment Assessment            Marital status:  (Living together).  Spouse/Partner name: Eren.  Lives with Children, Spouse.  3               children.  Living situation: Home/Independent.  Injuries/Abuse/Neglect in household: No.  Feels unsafe at               home: No.  Family/Friends available for support: Yes.      Nutrition and Health Assessment            Type of diet: Regular.  Wants to lose weight: No.  Sleeping concerns: No.  Feels highly stressed: No.      Exercise and Physical Activity Assessment: Occasional exercise             Exercise type: Walking.      Sexual Assessment            Sexually active: Yes.  Other contraceptive use: VAS.      Other Assessment            First menses age 13.  Regular menses.  Menstrual duration 5-6 days, cycle interval 28 days.  No history of               abnormal Pap smear.        Physical Examination   Measurements from flowsheet : Measurements   12/29/2021 2:00 PM CST   Height/Length Estimated   67 in     General:  Alert and oriented, No acute distress.    Eye:  Pupils are equal, round and reactive to light, Normal conjunctiva.    HENT:  Oral mucosa is moist.    Neck:  Supple.    Respiratory:  Respirations are non-labored.    Psychiatric:  Cooperative, Appropriate mood & affect, Normal judgment.       Impression and Plan   Diagnosis     Congestion of larynx (YEI85-DT J37.0).     Exposure to COVID-19 virus (HLC47-HO Z20.822).     Nasal congestion (ZJM16-YH R09.81).     Course:  patient with cold symptoms and positive home test, will do PCR test. Does not qualify for monoclonal antibody referral.    Orders     Orders (Selected)   Outpatient Orders  Completed  SARS-CoV-2 RNA (COVID-19), Qualitative NAAT (Request): Congestion of larynx  Exposure to COVID-19 virus.        Review / Management   Results review:  Lab results: 12/10/2021 7:59 AM CST   TSH                       1.74 mIU/L.

## 2022-02-15 NOTE — PROGRESS NOTES
Patient:   MELY GARCIA            MRN: [id number]            FIN: [id number]               Age:   51 years     Sex:  Female     :  1965   Associated Diagnoses:   Rash; Sjogren's disease; Fatigue   Author:   Dom Novoa MD      Chief Complaint   2017 4:01 PM CDT    c/o itching rash x Monday; has taken benadryl, claritin - not helping      History of Present Illness   Patient with diffuse itchy erythematous rash. Started on legs. Spreading to arms, chest, face. Very itchy. No specific trigger. No new foods. Off of antibiotics for more than a week. No fevers. Antihistamines helping very little.   Recent + test for Sjogren's. Also has autoimmune thyroiditis.       Health Status   Allergies:    Allergic Reactions (All)  Severe  Codeine (Vomiting and nausea)   Medications:  (Selected)   Prescriptions  Prescribed  Xopenex HFA 45 mcg/inh inhalation aerosol: 2 puff(s), inh, q4 hrs, PRN: as needed for wheezing, # 1 EA, 10 Refill(s), Type: Maintenance, Pharmacy: [facility]  chlorpheniramine-hydrocodone 8 mg-10 mg/5 mL oral suspension, extended release: 5 mL, PO, q12 hrs, PRN: for cough, # 120 mL, 0 Refill(s), Type: Maintenance, Pharmacy: [facility], 5 mL po q12 hrs,PRN:for cough  levothyroxine 112 mcg (0.112 mg) oral tablet: 1 tab(s) ( 112 mcg ), PO, Daily, # 90 tab(s), 3 Refill(s), Type: Maintenance, Pharmacy: [facility], 1 tab(s) po daily  Documented Medications  Documented  Multiple Vitamins oral tablet: 1 tab(s), po, daily, tab(s), 0 Refill(s), Type: Maintenance  Tylenol 325 mg oral tablet: 2 tab(s) ( 650 mg ), PO, q4hr, PRN: for pain, # 120 tab(s), 0 Refill(s), Type: Maintenance  Viactiv Soft Calcium Chews: 2 EA, daily, Instructions: chews, 0 Refill(s), Type: Maintenance  Vitamin D3: ( 2,000 International Unit ), daily, 0 Refill(s), Type: Maintenance  omeprazole 40 mg oral delayed release capsule: 1 cap(s) ( 40 mg ), PO, qam, # 90 cap(s), 0 Refill(s), Type: Maintenance  raNITIdine 300 mg oral  capsule: 1 cap(s) ( 300 mg ), PO, Once a day (at bedtime), PRN: for control of stomach acid, # 30 cap(s), 0 Refill(s), Type: Maintenance   Problem list:    All Problems (Selected)  Hypothyroidism / SNOMED CT 798487529 / Confirmed  Hashimoto's Thyroiditis / ICD-9-.2 / Confirmed      Histories   Past Medical History:    Active  Hashimoto's Thyroiditis (ICD-9-.2): Onset in 1996 at 31 years.  Resolved  Pregnancy (SNOMED CT 454238789):  Resolved in 1991 at 25 years.  Pregnancy (SNOMED CT 434572259):  Resolved in 1992 at 26 years.  Pregnancy (SNOMED CT 535877893):  Resolved in 1997 at 31 years.      Physical Examination   Vital Signs   6/29/2017 4:01 PM CDT Temperature Tympanic 99.1 DegF    Peripheral Pulse Rate 80 bpm    Pulse Site Radial artery    HR Method Manual    Systolic Blood Pressure 112 mmHg    Diastolic Blood Pressure 70 mmHg    Mean Arterial Pressure 84 mmHg    BP Site Left arm    BP Method Manual      Measurements from flowsheet : Measurements   6/29/2017 4:01 PM CDT Height Measured - Standard 66.5 in    Weight Measured - Standard 153.8 lb    BSA 1.81 m2    Body Mass Index 24.45 kg/m2      General:  Alert and oriented, No acute distress.    Integumentary:  erythematous non-blanching rash on shins with urticarial rash on arms, chest, face. .       Impression and Plan   Diagnosis     Rash (SRE68-UO R21).     Sjogren's disease (VAC60-OW M35.00).     Fatigue (OMT58-XH R53.83).     Orders     Orders (Selected)   Outpatient Orders  Ordered (In Transit)  DURAN Screen, IFA, with Reflex to Titer and Pattern* ([facility]): Specimen Type: Serum, Collection Date: 06/29/17 16:25:00 CDT  CBC (h/h, RBC, indices, WBC, Plt)* ([facility]): Specimen Type: Blood, Collection Date: 06/29/17 16:25:00 CDT  Rheumatoid factor* ([facility]): Specimen Type: Serum, Collection Date: 06/29/17 16:25:00 CDT  Sed rate by modified westergren* ([facility]): Specimen Type: Blood, Collection Date: 06/29/17 16:25:00  CDT  Prescriptions  Prescribed  predniSONE 10 mg oral tablet: as directed, PO, Daily, Instructions: 4 tablets for 4 days then 3 tablets for 4 days then 2 tablets for 4 days then 1 tablet for 4 days, # 40 tab(s), 0 Refill(s), Type: Maintenance, Pharmacy: [facility], as directed po daily,Instr:4 tablets for 4 da....

## 2022-02-15 NOTE — TELEPHONE ENCOUNTER
---------------------  From: Dom Novoa MD   To: NANI GARCIA    Sent: 12/12/2021 11:41:06 AM CST  Subject: results       Nani,  Your lab results are in range - let me know if you have questions.  Graciela Novoa    Results:  Date Result Name Value Ref Range   12/10/2021 7:59 AM TSH 1.74 mIU/L (0.40 - 4.50)

## 2022-02-15 NOTE — NURSING NOTE
Comprehensive Intake Entered On:  6/9/2021 8:09 AM CDT    Performed On:  6/9/2021 8:03 AM CDT by Franci Cotton CMA               Summary   Chief Complaint :   F/u labs (cholesterol and TSH) and med check    Menstrual Status :   Menarcheal   Weight Measured :   143 lb(Converted to: 143 lb 0 oz, 64.864 kg)    Height Measured :   67 in(Converted to: 5 ft 7 in, 170.18 cm)    Body Mass Index :   22.39 kg/m2   Body Surface Area :   1.75 m2   Height/Length Estimated :   67 in(Converted to: 5 ft 7 in, 170.18 cm)    Systolic Blood Pressure :   113 mmHg   Diastolic Blood Pressure :   75 mmHg   Mean Arterial Pressure :   88 mmHg   Peripheral Pulse Rate :   82 bpm   BP Site :   Right arm   BP Method :   Electronic   Temperature Tympanic :   98.1 DegF(Converted to: 36.7 DegC)    Oxygen Saturation :   99 %   Franci Cotton CMA - 6/9/2021 8:03 AM CDT   Health Status   Allergies Verified? :   Yes   Medication History Verified? :   Yes   Immunizations Current :   Yes   Pre-Visit Planning Status :   Completed   Tobacco Use? :   Never smoker   Franci Cotton CMA - 6/9/2021 8:03 AM CDT   Consents   Consent for Immunization Exchange :   Consent Granted   Consent for Immunizations to Providers :   Consent Granted   Franci Cotton CMA - 6/9/2021 8:03 AM CDT   Meds / Allergies   (As Of: 6/9/2021 8:09:02 AM CDT)   Allergies (Active)   Augmentin  Estimated Onset Date:   Unspecified ; Reactions:   rash ; Created By:   Karine Pabon CMA; Reaction Status:   Active ; Category:   Drug ; Substance:   Augmentin ; Type:   Allergy ; Updated By:   Karine Pabon CMA; Reviewed Date:   6/9/2021 8:04 AM CDT      codeine  Estimated Onset Date:   Unspecified ; Reactions:   Nausea, Vomiting ; Created By:   Yumiko Suarez CMA; Reaction Status:   Active ; Category:   Drug ; Substance:   codeine ; Type:   Allergy ; Severity:   Severe ; Updated By:   Yumiko Suarez CMA; Reviewed Date:   6/9/2021 8:04 AM CDT        Medication List   (As Of: 6/9/2021 8:09:02 AM CDT)    Prescription/Discharge Order    levothyroxine  :   levothyroxine ; Status:   Prescribed ; Ordered As Mnemonic:   levothyroxine 112 mcg (0.112 mg) oral tablet ; Simple Display Line:   112 mcg, 1 tab(s), PO, Daily, 90 tab(s), 3 Refill(s) ; Ordering Provider:   Dom Novoa MD; Catalog Code:   levothyroxine ; Order Dt/Tm:   6/8/2020 8:13:07 AM CDT          omeprazole  :   omeprazole ; Status:   Prescribed ; Ordered As Mnemonic:   omeprazole 20 mg oral delayed release capsule ; Simple Display Line:   20 mg, 1 cap(s), PO, Daily, 90 cap(s), 3 Refill(s) ; Ordering Provider:   Dom Novoa MD; Catalog Code:   omeprazole ; Order Dt/Tm:   6/8/2020 8:15:50 AM CDT            Home Meds    acetaminophen  :   acetaminophen ; Status:   Documented ; Ordered As Mnemonic:   Tylenol 325 mg oral tablet ; Simple Display Line:   650 mg, 2 tab(s), PO, q4hr, PRN: for pain, 120 tab(s), 0 Refill(s) ; Catalog Code:   acetaminophen ; Order Dt/Tm:   3/6/2017 9:07:21 AM CST          cevimeline  :   cevimeline ; Status:   Documented ; Ordered As Mnemonic:   cevimeline 30 mg oral capsule ; Simple Display Line:   30 mg, 1 cap(s), PO, TID, 90 cap(s), 0 Refill(s) ; Catalog Code:   cevimeline ; Order Dt/Tm:   3/20/2018 3:01:27 PM CDT          hydroxychloroquine  :   hydroxychloroquine ; Status:   Documented ; Ordered As Mnemonic:   hydroxychloroquine 200 mg oral tablet ; Simple Display Line:   200 mg, 1 tab(s), po, daily, 180 tab(s), 0 Refill(s) ; Catalog Code:   hydroxychloroquine ; Order Dt/Tm:   3/20/2018 3:00:18 PM CDT          multivitamin  :   multivitamin ; Status:   Documented ; Ordered As Mnemonic:   Multiple Vitamins oral tablet ; Simple Display Line:   1 tab(s), po, daily, tab(s) ; Catalog Code:   multivitamin ; Order Dt/Tm:   3/22/2011 9:01:05 AM CDT          multivitamin with minerals  :   multivitamin with minerals ; Status:   Documented ; Ordered As Mnemonic:   Viactiv Soft Calcium Chews ; Simple Display Line:   2 EA, daily,  chews, 0 Refill(s) ; Catalog Code:   multivitamin with minerals ; Order Dt/Tm:   3/6/2017 9:06:54 AM CST            ID Risk Screen   Recent Travel History :   No recent travel   Family Member Travel History :   No recent travel   Other Exposure to Infectious Disease :   Unknown   COVID-19 Testing Status :   No positive COVID-19 test   Franci Cotton CMA - 6/9/2021 8:03 AM CDT

## 2022-02-15 NOTE — TELEPHONE ENCOUNTER
---------------------  From: Sommer VELA [facility]   To: NANI GARCIA    Sent: 6/1/2019 10:07:45 PM CDT    Nani,  Your labs are normal. See you on the 5th!  Graciela Novoa    Results:  Date Result Name Value Ref Range   5/31/2019 8:18 AM Glucose Level 92 mg/dL (65 - 99)   5/31/2019 8:18 AM TSH 0.73 mIU/L

## 2022-02-15 NOTE — PROGRESS NOTES
"   Patient:   MELY GARCIA            MRN: [id number]            FIN: [id number]               Age:   53 years     Sex:  Female     :  1965   Associated Diagnoses:   Frozen shoulder; Left shoulder pain   Author:   Dom Novoa MD      Visit Information      Date of Service: 2019 09:30 am  Performing Location: [facility]  Encounter#: [id number]      Primary Care Provider (PCP):  Dom Novoa MD    NPI# [id number]      Referring Provider:  Dom Novoa MD    NPI# [id number]      Chief Complaint   2019 9:33 AM CST    left shoulder pain with certain movements; no known injury      History of Present Illness   2-3 months of Left shoulder pain. Right-handed.  Worse with movement especially abduction, pulling car door closed, and reaching out of the car window.  Severity/quality is 3-4/10 and achy at rest and 8/10 and \"searing\" with certain movements that lasts minutes.  Searing pain sometimes radiates to thumb.  Symptoms progressively worsening.  Tylenol provides some relief. Tried Icy Hot and Salon Pas patches with some relief.  Does not recall provoking event.  No weakness, numbness, tingling.  Chronic hip stiffness but otherwise no pain in other joints.      Review of Systems   Constitutional:  Negative.    Respiratory:  Negative.    Cardiovascular:  Negative.    Gastrointestinal:  Negative.    Musculoskeletal:  Negative except as documented in history of present illness.    Integumentary:  Negative.    Neurologic:  Negative.       Health Status   Allergies:    Allergic Reactions (Selected)  Severe  Codeine (Nausea and vomiting)  Severity Not Documented  Augmentin (Rash)   Medications:  (Selected)   Prescriptions  Prescribed  Xopenex HFA 45 mcg/inh inhalation aerosol: 2 puff(s), inh, q4 hrs, PRN: as needed for wheezing, # 1 EA, 10 Refill(s), Type: Maintenance, Pharmacy: [facility]  chlorpheniramine-hydrocodone 8 mg-10 mg/5 mL oral suspension, extended release: 5 mL, PO, q12 " hrs, PRN: for cough, # 120 mL, 0 Refill(s), Type: Maintenance, Pharmacy: [facility], 5 mL po q12 hrs,PRN:for cough  levothyroxine 112 mcg (0.112 mg) oral tablet: 1 tab(s) ( 112 mcg ), PO, Daily, # 90 tab(s), 3 Refill(s), Type: Maintenance, Pharmacy: [facility], 1 tab(s) po daily  Documented Medications  Documented  Multiple Vitamins oral tablet: 1 tab(s), po, daily, tab(s), 0 Refill(s), Type: Maintenance  Tylenol 325 mg oral tablet: 2 tab(s) ( 650 mg ), PO, q4hr, PRN: for pain, # 120 tab(s), 0 Refill(s), Type: Maintenance  Viactiv Soft Calcium Chews: 2 EA, daily, Instructions: chews, 0 Refill(s), Type: Maintenance  cevimeline 30 mg oral capsule: 1 cap(s) ( 30 mg ), PO, TID, # 90 cap(s), 0 Refill(s), Type: Maintenance  hydroxychloroquine 200 mg oral tablet: 1 tab(s) ( 200 mg ), po, bid, # 180 tab(s), 0 Refill(s), Type: Maintenance  omeprazole 20 mg oral delayed release capsule: 1 cap(s) ( 20 mg ), PO, Daily, # 90 cap(s), 0 Refill(s), Type: Maintenance,    Medications          *denotes recorded medication          *Tylenol 325 mg oral tablet: 650 mg, 2 tab(s), PO, q4hr, PRN: for pain, 120 tab(s), 0 Refill(s).          *cevimeline 30 mg oral capsule: 30 mg, 1 cap(s), PO, TID, 90 cap(s), 0 Refill(s).          chlorpheniramine-hydrocodone 8 mg-10 mg/5 mL oral suspension, extended release: 5 mL, PO, q12 hrs, PRN: for cough, 120 mL, 0 Refill(s).          *hydroxychloroquine 200 mg oral tablet: 200 mg, 1 tab(s), po, bid, 180 tab(s), 0 Refill(s).          Xopenex HFA 45 mcg/inh inhalation aerosol: 2 puff(s), inh, q4 hrs, 1 EA, PRN: as needed for wheezing.          levothyroxine 112 mcg (0.112 mg) oral tablet: 112 mcg, 1 tab(s), PO, Daily, 90 tab(s), 3 Refill(s).          *Multiple Vitamins oral tablet: 1 tab(s), po, daily, tab(s).          *Viactiv Soft Calcium Chews: 2 EA, daily, chews, 0 Refill(s).          *omeprazole 20 mg oral delayed release capsule: 20 mg, 1 cap(s), PO, Daily, 90 cap(s), 0 Refill(s).     Problem  list:    All Problems (Selected)  Hypothyroidism / SNOMED CT 907368421 / Confirmed  Hashimoto's Thyroiditis / ICD-9-.2 / Confirmed  Sjogren's disease / SNOMED CT 710724119 / Confirmed      Histories   Past Medical History:    Active  Hashimoto's Thyroiditis (245.2): Onset in  at 31 years.  Hypothyroidism (789527930)  Sjogren's disease (854041734)  Resolved  Pregnancy (392506906):  Resolved in  at 25 years.  Pregnancy (448085274):  Resolved in  at 26 years.  Pregnancy (266399476):  Resolved in  at 31 years.   Family History:    Cancer  Mother (): onset at 71 .  Comments:  3/6/2017 11:01 AM RICARDO Novoa MD, Dom  unknown primary cancer     Procedure history:    Esophagogastroduodenoscopy (797166634) on 2018 at 52 Years.  Comments:  2/15/2018 9:07 AM Manuela Reyes  Indication:  History of gastric polyps with low-grade dysplasia.  Esophagogastroduodenoscopy (379873044) on 2016 at 50 Years.  Comments:  2017 12:20 PM Manuela Gonzáles  Dx:  Abdominal pain; dysphagia.  Colonoscopy (643260662) in  at 50 Years.  Cholecystectomy (19034228).  Tonsillectomy (066363758).  Comments:  2014 10:00 AM Bailee Orellana LPN  Childhood   Social History:        Alcohol Assessment            Current, 1 x per month., 1 drinks/episode average.                     Comments:                      2017 - Bailee Knox LPN                     Rare alcohol      Tobacco Assessment: Denies Tobacco Use            Never (less than 100 in lifetime)      Substance Abuse Assessment: Denies Substance Abuse            Never      Employment and Education Assessment            Employed                     Comments:                      2014 - Bailee Knox LPN                     [employer]      Home and Environment Assessment            Marital status:  (Living together).  Spouse/Partner name: Eren.  Lives with Children, Spouse.  3               children.  Living  situation: Home/Independent.      Nutrition and Health Assessment            Type of diet: Regular.      Exercise and Physical Activity Assessment: Occasional exercise            Exercise type: Walking.      Sexual Assessment            Sexually active: Yes.  Other contraceptive use: VAS.      Other Assessment            First menses age 13.  Regular menses.  Menstrual duration 5-6 days, cycle interval 28 days.  No history of               abnormal Pap smear.      Physical Examination   Vital Signs   2/11/2019 9:33 AM CST Temperature Tympanic 98.0 DegF    Peripheral Pulse Rate 64 bpm    HR Method Manual    Systolic Blood Pressure 104 mmHg    Diastolic Blood Pressure 74 mmHg    Mean Arterial Pressure 84 mmHg    BP Method Manual      Measurements from flowsheet : Measurements   2/11/2019 9:33 AM CST Height Measured - Standard 67 in    Weight Measured - Standard 146.8 lb    BSA 1.77 m2    Body Mass Index 22.99 kg/m2      General:  Alert and oriented, No acute distress.    Musculoskeletal:       Upper extremity exam: Shoulder ( Left, Not displaced, No deformity, No erythema, No ecchymosis, No swelling, No tenderness, No crepitus, Strength  5 /5, Range of motion ( Diminished, Active, Passive, Abduction ) ), Elbow ( Left, Strength  5 /5, Normal range of motion ).       Review / Management   Differential diagnosis:  Frozen shoulder vs. rotator cuff tendonitis vs. osteoarthritis.       Impression and Plan   Diagnosis     Frozen shoulder (SIE47-JG M75.00).     Left shoulder pain (ITU38-KJ M25.512).     Plan:  Referred for physical therapy.  Continue using Tylenol PRN..    Patient Instructions:       Counseled: Patient, Regarding diagnosis, Regarding treatment, Verbalized understanding.      Patient was seen with student SHELDON Hoyos, and history and exam confirmed. Agree that documentation reflects findings and plan.  Dom Novoa MD

## 2022-02-15 NOTE — NURSING NOTE
Depression Screening Entered On:  6/5/2019 9:01 AM CDT    Performed On:  6/5/2019 9:01 AM CDT by Halima Tabor CMA               Depression Screening   Little Interest - Pleasure in Activities :   Not at all   Feeling Down, Depressed, Hopeless :   Not at all   Initial Depression Screen Score :   0    Trouble Falling or Staying Asleep :   Not at all   Feeling Tired or Little Energy :   Several days   Poor Appetite or Overeating :   Not at all   Feeling Bad About Yourself :   Not at all   Trouble Concentrating :   Not at all   Moving or Speaking Slowly :   Not at all   Thoughts Better Off Dead or Hurting Self :   Not at all   Detailed Depression Screen Score :   1    Total Depression Screen Score :   1    Halima Tabor CMA - 6/5/2019 9:01 AM CDT

## 2022-02-15 NOTE — PROGRESS NOTES
Patient:   MELY GARCIA            MRN: [id number]            FIN: [id number]               Age:   51 years     Sex:  Female     :  1965   Associated Diagnoses:   Pap smear for cervical cancer screening; Well adult exam; Hypothyroidism   Author:   Dom Novoa MD      Visit Information   Visit type:  Annual exam.    Source of history:  Self.    History limitation:  None.       Chief Complaint   3/6/2017 8:58 AM CST     Annual exam,pap. Discuss thyroid. Review labs      Well Adult History   Well Adult History             The patient presents for well adult exam.  The patient's general health status is described as good.  Notes heavy periods. Irregular. Does not want to pursue treatment at this time. Also continuing to follow with GI. Has polyp with mild dysplasia on EGD a year ago. Has had 2 EGDs since. Continues to have swallowing issues. Having gastric emptying study later this week. If normal, will likely just stick with altering diet and monitor. Has been offered other treatments such as botox but not interested at this time. Will be on PPI and V9zayvisi for life per gastroenterologist..  The patient's diet is described as balanced and Watches very carefully due to dysphagia issues..  Exercise: routine.  Associated symptoms consist of none.  Last menstrual period: irregular.  Medical encounters: none.  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        Review of Systems   Constitutional:  Negative.    Ear/Nose/Mouth/Throat:  Negative.    Respiratory:  Negative.    Cardiovascular:  Negative.    Breast:  Negative.    Gastrointestinal:  Negative except as documented in history of present illness.    Genitourinary:  Negative.    Gynecologic:  Negative.    Hematology/Lymphatics:  Negative.    Endocrine:  Cold intolerance.    Immunologic:  Negative.    Musculoskeletal:  Negative.    Integumentary:  Negative.    Neurologic:  Negative.    Psychiatric:   Negative.    All other systems reviewed and negative      Health Status   Allergies:    Allergic Reactions (Selected)  Severe  Codeine (Vomiting and nausea)   Medications:  (Selected)   Prescriptions  Prescribed  Xopenex HFA 45 mcg/inh inhalation aerosol: 2 puff(s), inh, q4 hrs, PRN: as needed for wheezing, # 1 EA, 10 Refill(s), Type: Maintenance, Pharmacy: [facility]  chlorpheniramine-hydrocodone 8 mg-10 mg/5 mL oral suspension, extended release: 5 mL, PO, q12hr, PRN: for cough, # 120 mL, 1 Refill(s), Type: Maintenance, handwritten (Rx)  levothyroxine 125 mcg (0.125 mg) oral tablet: 1 tab(s) ( 125 mcg ), po, daily, # 90 tab(s), 3 Refill(s), Type: Maintenance, Pharmacy: [facility], 1 tab(s) po daily  Documented Medications  Documented  Multiple Vitamins oral tablet: 1 tab(s), po, daily, tab(s), 0 Refill(s), Type: Maintenance  Tylenol 325 mg oral tablet: 2 tab(s) ( 650 mg ), PO, q4hr, PRN: for pain, # 120 tab(s), 0 Refill(s), Type: Maintenance  Viactiv Soft Calcium Chews: 2 EA, daily, Instructions: chews, 0 Refill(s), Type: Maintenance  Vitamin D3: ( 2,000 International Unit ), daily, 0 Refill(s), Type: Maintenance  omeprazole 40 mg oral delayed release capsule: 1 cap(s) ( 40 mg ), PO, qam, # 90 cap(s), 0 Refill(s), Type: Maintenance  raNITIdine 300 mg oral capsule: 1 cap(s) ( 300 mg ), PO, Once a day (at bedtime), PRN: for control of stomach acid, # 30 cap(s), 0 Refill(s), Type: Maintenance   Problem list:    All Problems  Hashimoto's Thyroiditis / ICD-9-.2 / Confirmed  Hypothyroidism / SNOMED CT 144251150 / Confirmed  Resolved: Pregnancy / SNOMED CT 529503925  Resolved: Pregnancy / SNOMED CT 399927521  Resolved: Pregnancy / SNOMED CT 339352421      Histories   Past Medical History:    Active  Hashimoto's Thyroiditis (ICD-9-.2): Onset in 1996 at 31 years.  Resolved  Pregnancy (SNOMED CT 616738268):  Resolved in 1991 at 25 years.  Pregnancy (SNOMED CT 101406315):  Resolved in 1992 at 26  years.  Pregnancy (SNOMED CT 462014432):  Resolved in  at 31 years.   Family History:    Sister: Kiara      History is negative.  Daughter: Lexus      History is negative.  Son: Elton      History is negative.  Son: Keith      History is negative.  Sister: Kavitha      History is negative.  Mother: mother    Cancer: onset at 71 .  Comments:  3/6/2017 11:01 AM - Sommer VELA, [place]  unknown primary cancer  Father: Fox  ()  Age at death unknown.     History is negative.     Procedure history:    Cholecystectomy (81903733).  Tonsillectomy (325988246).  Comments:  2014 10:00 AM - Bailee Knox LPN  Childhood   Social History:        Alcohol Assessment            Current, 1 x per month., 1 drinks/episode average.                     Comments:                      2017 - Bailee Knox LPN                     Rare alcohol      Tobacco Assessment: Denies Tobacco Use      Substance Abuse Assessment: Denies Substance Abuse      Employment and Education Assessment            Employed                     Comments:                      2014 - Bailee Knox LPN                     [employer]      Home and Environment Assessment            Marital status:  (Living together).  Spouse/Partner name: Eren.  Lives with Children, Spouse.  3               children.  Living situation: Home/Independent.      Nutrition and Health Assessment            Type of diet: Regular.      Exercise and Physical Activity Assessment: Occasional exercise            Exercise type: Walking.      Sexual Assessment            Sexually active: Yes.  Other contraceptive use: VAS.      Other Assessment            First menses age 13.  Regular menses.  Menstrual duration 5-6 days, cycle interval 28 days.  No history of               abnormal Pap smear.        Physical Examination   Last Menstrual Period: 2017   Vital Signs   3/6/2017 8:58 AM CST Temperature Tympanic 98.4 DegF    Peripheral Pulse Rate 64 bpm    Pulse Site  Radial artery    HR Method Manual    Systolic Blood Pressure 104 mmHg    Diastolic Blood Pressure 64 mmHg    Mean Arterial Pressure 77 mmHg    BP Site Right arm    BP Method Manual      Measurements from flowsheet : Measurements   3/6/2017 8:58 AM CST Height Measured - Standard 66.5 in    Weight Measured - Standard 149.25 lb    BSA 1.78 m2    Body Mass Index 23.73 kg/m2      General:  Alert and oriented, No acute distress.    Eye:  Pupils are equal, round and reactive to light, Extraocular movements are intact, Normal conjunctiva.    HENT:  Normocephalic, Oral mucosa is moist, No pharyngeal erythema, wax present in both canals but not obstructive.    Neck:  Supple, Non-tender, No lymphadenopathy, No thyromegaly.    Respiratory:  Lungs are clear to auscultation.    Cardiovascular:  Normal rate, Regular rhythm.    Breast:  No mass, No tenderness, No discharge.    Gastrointestinal:  Soft, Non-tender, Non-distended, No organomegaly.    Genitourinary:  Normal genitalia for age and sex, No urethral discharge, No lesions.         Perineum: Within normal limits.         Vagina: Within normal limits.         Uterus: Within normal limits.         Ovaries: Within normal limits.         Adnexa: Within normal limits.    Musculoskeletal:  Normal range of motion, Normal strength.    Integumentary:  Warm, Dry, Pink, No rash, no concerning lesions.    Neurologic:  Alert, Oriented, Normal sensory.    Psychiatric:  Cooperative, Appropriate mood & affect.       Review / Management   Results review:  Lab results   2/23/2017 8:58 AM CST TSH 0.09 mIU/L  LOW (Modified)    WBC 4.3 (Modified)    RBC 4.35 (Modified)    Hgb 12.7 gm/dL (Modified)    Hct 38.1 % (Modified)    MCV 87.6 fL (Modified)    MCH 29.2 pg (Modified)    MCHC 33.3 gm/dL (Modified)    RDW 13.7 % (Modified)    Platelet 262 (Modified)    MPV 8.8 fL (Modified)   .       Impression and Plan   Diagnosis     Pap smear for cervical cancer screening (COT66-JP Z12.4).     Well adult  exam (HSU73-GD Z00.00).     Course:  Progressing as expected.    Patient Instructions:       Counseled: Patient, BMI, diet, and exercise.    Diagnosis     Hypothyroidism (QOF93-KG E06.3).     Course:  will try decreased dose of levothyroxine.    Orders     Orders (Selected)   Outpatient Orders  Ordered  Return to Clinic (Request): RFV: lab visit for TSH, Return in 3 months  Prescriptions  Prescribed  levothyroxine 112 mcg (0.112 mg) oral tablet: 1 tab(s) ( 112 mcg ), PO, Daily, # 90 tab(s), 3 Refill(s), Type: Maintenance, Pharmacy: [facility], 1 tab(s) po daily.

## 2022-02-15 NOTE — NURSING NOTE
Hearing and Vision Screening Entered On:  6/9/2021 8:09 AM CDT    Performed On:  6/9/2021 8:09 AM CDT by Franci Cotton CMA               Hearing and Vision Screening   Audiogram Result Right Ear :   Pass   Audiogram Result Left Ear :   Pass   Franci Cotton CMA - 6/9/2021 8:09 AM CDT

## 2022-02-15 NOTE — TELEPHONE ENCOUNTER
I talked to her. Not suicidal. Will restart sertraline and sent in Ambien 10 mg for HS PRN. Will call with updates.    KAH

## 2022-02-15 NOTE — NURSING NOTE
Comprehensive Intake Entered On:  12/29/2021 2:02 PM CST    Performed On:  12/29/2021 2:00 PM CST by Lurdes Sow               Summary   Chief Complaint :   Pt consent to video visit. Pos COVID today. Would like to talk about  treatment.    Menstrual Status :   Menarcheal   Height/Length Estimated :   67 in(Converted to: 5 ft 7 in, 170.18 cm)    Lurdes Sow - 12/29/2021 2:00 PM CST   Health Status   Allergies Verified? :   Yes   Medication History Verified? :   Yes   Immunizations Current :   Yes   Medical History Verified? :   Yes   Pre-Visit Planning Status :   Completed   Tobacco Use? :   Never smoker   Lurdes Sow - 12/29/2021 2:00 PM CST   Meds / Allergies   (As Of: 12/29/2021 2:02:18 PM CST)   Allergies (Active)   Augmentin  Estimated Onset Date:   Unspecified ; Reactions:   rash ; Created By:   Karine Pabon CMA; Reaction Status:   Active ; Category:   Drug ; Substance:   Augmentin ; Type:   Allergy ; Updated By:   Karine Pabon CMA; Reviewed Date:   6/9/2021 8:20 AM CDT      codeine  Estimated Onset Date:   Unspecified ; Reactions:   Nausea, Vomiting ; Created By:   Yumiko Suarez CMA; Reaction Status:   Active ; Category:   Drug ; Substance:   codeine ; Type:   Allergy ; Severity:   Severe ; Updated By:   Yumiko Suarez CMA; Reviewed Date:   6/9/2021 8:20 AM CDT        Medication List   (As Of: 12/29/2021 2:02:18 PM CST)   Prescription/Discharge Order    omeprazole  :   omeprazole ; Status:   Prescribed ; Ordered As Mnemonic:   omeprazole 20 mg oral delayed release capsule ; Simple Display Line:   20 mg, 1 cap(s), PO, Daily, 90 cap(s), 3 Refill(s) ; Ordering Provider:   Dom Novoa MD; Catalog Code:   omeprazole ; Order Dt/Tm:   6/9/2021 8:31:37 AM CDT          levothyroxine  :   levothyroxine ; Status:   Prescribed ; Ordered As Mnemonic:   levothyroxine 112 mcg (0.112 mg) oral tablet ; Simple Display Line:   112 mcg, 1 tab(s), PO, Daily, 90 tab(s), 3 Refill(s) ; Ordering Provider:    Sommer VELA [facility]; Catalog Code:   levothyroxine ; Order Dt/Tm:   6/9/2021 8:30:32 AM CDT            Home Meds    cevimeline  :   cevimeline ; Status:   Documented ; Ordered As Mnemonic:   cevimeline 30 mg oral capsule ; Simple Display Line:   30 mg, 1 cap(s), PO, TID, 90 cap(s), 0 Refill(s) ; Catalog Code:   cevimeline ; Order Dt/Tm:   3/20/2018 3:01:27 PM CDT          hydroxychloroquine  :   hydroxychloroquine ; Status:   Documented ; Ordered As Mnemonic:   hydroxychloroquine 200 mg oral tablet ; Simple Display Line:   200 mg, 1 tab(s), po, daily, 180 tab(s), 0 Refill(s) ; Catalog Code:   hydroxychloroquine ; Order Dt/Tm:   3/20/2018 3:00:18 PM CDT          acetaminophen  :   acetaminophen ; Status:   Documented ; Ordered As Mnemonic:   Tylenol 325 mg oral tablet ; Simple Display Line:   650 mg, 2 tab(s), PO, q4hr, PRN: for pain, 120 tab(s), 0 Refill(s) ; Catalog Code:   acetaminophen ; Order Dt/Tm:   3/6/2017 9:07:21 AM CST          multivitamin  :   multivitamin ; Status:   Documented ; Ordered As Mnemonic:   Multiple Vitamins oral tablet ; Simple Display Line:   1 tab(s), po, daily, tab(s) ; Catalog Code:   multivitamin ; Order Dt/Tm:   3/22/2011 9:01:05 AM CDT

## 2022-02-15 NOTE — PROGRESS NOTES
Patient:   MELY GARCIA            MRN: [id number]            FIN: [id number]               Age:   52 years     Sex:  Female     :  1965   Associated Diagnoses:   Well adult exam; Hypothyroidism; Sjogren's disease; Menorrhagia   Author:   Dom Novoa MD      Visit Information   Visit type:  Annual exam.    Source of history:  Self.    History limitation:  None.       Chief Complaint   2018 10:00 AM CDT   Annual Exam; discuss increased flow during menstrual cycle; Med Check & review labs      Well Adult History   Well Adult History             The patient presents for well adult exam.  The patient's general health status is described as good.  Notes that periods have been progressively more heavy. Had CT for other reasons that showed fibroids and possible polyp. Will see obgyn. Notes periods are very heavy, super tampon and pad every hour, has been going on for more than a year.  The patient's diet is described as balanced.  Exercise: routine.  Medical encounters: continues to follow with rheumatology regarding Sjogrens, has noticed some improvement on current regimen.  Additional pertinent history: preventive services reviewed.        Review of Systems   ROS reviewed as documented in chart      Health Status   Allergies:    Allergic Reactions (Selected)  Severe  Codeine (Nausea and vomiting)  Severity Not Documented  Augmentin (Rash)   Medications:  (Selected)   Prescriptions  Prescribed  Xopenex HFA 45 mcg/inh inhalation aerosol: 2 puff(s), inh, q4 hrs, PRN: as needed for wheezing, # 1 EA, 10 Refill(s), Type: Maintenance, Pharmacy: [facility]  chlorpheniramine-hydrocodone 8 mg-10 mg/5 mL oral suspension, extended release: 5 mL, PO, q12 hrs, PRN: for cough, # 120 mL, 0 Refill(s), Type: Maintenance, Pharmacy: [facility], 5 mL po q12 hrs,PRN:for cough  levothyroxine 112 mcg (0.112 mg) oral tablet: 1 tab(s) ( 112 mcg ), PO, Daily, # 90 tab(s), 3 Refill(s), Type: Maintenance, Pharmacy: [facility], 1 tab(s) po daily  Documented Medications  Documented  Multiple Vitamins oral tablet: 1 tab(s), po, daily, tab(s), 0 Refill(s), Type: Maintenance  Tylenol 325 mg oral tablet: 2 tab(s) ( 650 mg ), PO, q4hr, PRN: for pain, # 120 tab(s), 0 Refill(s), Type: Maintenance  Viactiv Soft Calcium Chews: 2 EA, daily, Instructions: chews, 0 Refill(s), Type: Maintenance  Vitamin D3: ( 2,000 International Unit ), daily, 0 Refill(s), Type: Maintenance  cevimeline 30 mg oral capsule: 1 cap(s) ( 30 mg ), PO, TID, # 90 cap(s), 0 Refill(s), Type: Maintenance  hydroxychloroquine 200 mg oral tablet: 1 tab(s) ( 200 mg ), po, bid, # 180 tab(s), 0 Refill(s), Type: Maintenance  omeprazole 20 mg oral delayed release capsule: 1 cap(s) ( 20 mg ), PO, Daily, # 90 cap(s), 0 Refill(s), Type: Maintenance   Problem list:    All Problems  Hashimoto's Thyroiditis / ICD-9-.2 / Confirmed  Hypothyroidism / SNOMED CT 760837244 / Confirmed  Sjogren's disease / SNOMED CT 571659463 / Confirmed  Resolved: Pregnancy / SNOMED CT 19652  Resolved: Pregnancy / SNOMED CT 375213860  Resolved: Pregnancy / SNOMED CT 238927863      Histories   Past Medical History:    Active  Hashimoto's Thyroiditis (ICD-9-.2): Onset in 1996 at 31 years.  Resolved  Pregnancy (SNOMED CT 672740486):  Resolved in 1991 at 25 years.  Pregnancy (SNOMED CT 099916844):  Resolved in 1992 at 26 years.  Pregnancy (SNOMED CT 955093905):  Resolved in 1997 at 31 years.   Family History:    Cancer  Mother (mother): onset at 71 .  Comments:  3/6/2017 11:01 AM - Dom Novoa MD  unknown primary cancer     Procedure history:    Esophagogastroduodenoscopy (304224798) on 1/30/2018 at 52 Years.  Comments:  2/15/2018 9:07 AM - Manuela Daley  Indication:  History of gastric polyps with low-grade dysplasia.  Esophagogastroduodenoscopy (022035101) on 7/6/2016 at 50 Years.  Comments:  4/20/2017 12:20 PM - Manuela Daley  Dx:  Abdominal pain; dysphagia.  Cholecystectomy  (72756363).  Tonsillectomy (345327484).  Comments:  5/8/2014 10:00 AM - Bailee Knox LPN  Childhood   Social History:        Alcohol Assessment            Current, 1 x per month., 1 drinks/episode average.                     Comments:                      03/06/2017 - Bailee Knox LPN                     Rare alcohol      Tobacco Assessment: Denies Tobacco Use      Substance Abuse Assessment: Denies Substance Abuse      Employment and Education Assessment            Employed                     Comments:                      05/08/2014 - Bailee Knox LPN                     [employer]      Home and Environment Assessment            Marital status:  (Living together).  Spouse/Partner name: Eren.  Lives with Children, Spouse.  3               children.  Living situation: Home/Independent.      Nutrition and Health Assessment            Type of diet: Regular.      Exercise and Physical Activity Assessment: Occasional exercise            Exercise type: Walking.      Sexual Assessment            Sexually active: Yes.  Other contraceptive use: VAS.      Other Assessment            First menses age 13.  Regular menses.  Menstrual duration 5-6 days, cycle interval 28 days.  No history of               abnormal Pap smear.        Physical Examination   Vital Signs   4/26/2018 10:00 AM CDT Temperature Tympanic 97.8 DegF  LOW    Peripheral Pulse Rate 72 bpm    Pulse Site Radial artery    HR Method Manual    Systolic Blood Pressure 102 mmHg    Diastolic Blood Pressure 60 mmHg    Mean Arterial Pressure 74 mmHg    BP Site Left arm    BP Method Manual      Measurements from flowsheet : Measurements   4/26/2018 10:00 AM CDT Height Measured - Standard 67 in    Weight Measured - Standard 148 lb    BSA 1.78 m2    Body Mass Index 23.18 kg/m2      General:  Alert and oriented, No acute distress.    Eye:  Pupils are equal, round and reactive to light, Extraocular movements are intact, Normal conjunctiva.    HENT:   Normocephalic, Tympanic membranes are clear, Oral mucosa is moist, No pharyngeal erythema.    Neck:  Supple, Non-tender, No lymphadenopathy, No thyromegaly.    Respiratory:  Lungs are clear to auscultation.    Cardiovascular:  Normal rate, Regular rhythm.    Breast:  No mass, No tenderness, No discharge.    Gastrointestinal:  Soft, Non-tender, Non-distended, No organomegaly.    Musculoskeletal:  Normal range of motion, Normal strength.    Integumentary:  Warm, Dry, Pink, No rash, no concerning lesions.    Neurologic:  Alert, Oriented, Normal sensory.    Psychiatric:  Cooperative, Appropriate mood & affect.       Review / Management   Results review:  Lab results   4/18/2018 8:24 AM CDT Glucose Level 82 mg/dL    Cholesterol 136 mg/dL    Non-HDL Cholesterol 84    HDL 52 mg/dL    Cholesterol/HDL Ratio 2.6    LDL 69    Triglyceride 66 mg/dL    TSH 1.13 mIU/L   .       Impression and Plan   Diagnosis     Well adult exam (PXN45-SG Z00.00).     Course:  Progressing as expected.    Patient Instructions:       Counseled: Patient, BMI, diet, and exercise.    Diagnosis     Hypothyroidism (NUY23-LR E06.3).     Sjogren's disease (EXF49-LN M35.00).     Course:  stable, continue current regimen.    Orders     Orders (Selected)   Outpatient Orders  Ordered  Return to Clinic (Request): RFV: Annual Exam & Med Check with blood work, Return in 1 year  Prescriptions  Prescribed  levothyroxine 112 mcg (0.112 mg) oral tablet: 1 tab(s) ( 112 mcg ), PO, Daily, # 90 tab(s), 3 Refill(s), Type: Maintenance, Pharmacy: [facility], 1 tab(s) po daily.     Diagnosis     Menorrhagia (PQX34-OC N92.0).     Course:  Worsening.    Orders     Orders (Selected)   Outpatient Orders  Ordered  Referral (Request): 04/26/18 11:53:00 CDT, Referred to: Obstetrics & Gynecology, Reason for referral: heavy menstrual bleeding, Menorrhagia.

## 2022-02-15 NOTE — TELEPHONE ENCOUNTER
Entered by Halima Tabor CMA on May 14, 2020 10:16:43 AM CDT  ---------------------  From: Halima Tabor CMA   To: [facility]    Sent: 5/14/2020 10:16:39 AM CDT  Subject: Medication Management     ** Not Approved: Patient has requested refill too soon **  levothyroxine (LEVOTHYROXIN TAB 112MCG TABLET)  TAKE ONE TABLET BY MOUTH ONCE DAILY  Qty:  90 unknown unit        Days Supply:  0        Refills:  0          Substitutions Allowed     Route To Pharmacy - [facility]   Signed by Halima Tabor CMA              ** Patient matched by Halima Tabor CMA on 5/14/2020 9:35:35 AM CDT **      ------------------------------------------  From: [facility]  To: Dom Novoa MD  Sent: May 14, 2020 9:10:16 AM CDT  Subject: Medication Management  Due: May 15, 2020 9:10:16 AM CDT    ** On Hold Pending Signature **  Drug: levothyroxine (levothyroxine 112 mcg (0.112 mg) oral tablet)  TAKE ONE TABLET BY MOUTH ONCE DAILY  Quantity: 90 unknown unit  Days Supply: 0  Refills: 0  Substitutions Allowed  Notes from Pharmacy:     Dispensed Drug: levothyroxine (levothyroxine 112 mcg (0.112 mg) oral tablet)  TAKE ONE TABLET BY MOUTH ONCE DAILY  Quantity: 90 unknown unit  Days Supply: 0  Refills: 0  Substitutions Allowed  Notes from Pharmacy:   ------------------------------------------Patient has a lab appt and follow up video visit set up. Has enough until thenReturned Call  Time: 10:11 am  Note:  Pt called back. Is needing a two week supply to get her through until her apt as she is out on Saturday.

## 2022-02-15 NOTE — PROGRESS NOTES
Patient:   MELY GARCIA            MRN: [id number]            FIN: [id number]               Age:   52 years     Sex:  Female     :  1965   Associated Diagnoses:   Sjogren's disease; Hashimoto's Thyroiditis; Menorrhagia; Preop examination   Author:   Dom Novoa MD      Chief Complaint   2018 12:56 PM CDT   Pre-op: D/C on 18 @ [facility] with Dr. Tillman       Patient with heavy bleeding, periods have been progressively more heavy. Had ultrasound that showed fibroids. Scheduled for D&C and hysteroscopy. Hoping for more managable periods until menopause.  Known to be iron deficient. Starting oral supplements. Has had trouble tolerating in the past with severe GI distress and diarrhea. Has required infusions in the past.      Review of Systems   Constitutional:  Negative.    Eye:  Negative.    Ear/Nose/Mouth/Throat:  Negative.    Respiratory:  Negative.    Cardiovascular:  Negative.    Gastrointestinal:  Negative.    Genitourinary:  Negative.    Gynecologic:  negative except per HPI.    Hematology/Lymphatics:  Negative.    Endocrine:  Negative.    Immunologic:  Negative.    Musculoskeletal:  Joint pain.    Integumentary:  Negative.    Neurologic:  Negative.    Psychiatric:  Negative.       Health Status   Allergies:    Allergic Reactions (All)  Severe  Codeine (Nausea and vomiting)  Severity Not Documented  Augmentin (Rash)   Medications:  (Selected)   Prescriptions  Prescribed  Xopenex HFA 45 mcg/inh inhalation aerosol: 2 puff(s), inh, q4 hrs, PRN: as needed for wheezing, # 1 EA, 10 Refill(s), Type: Maintenance, Pharmacy: [facility]  chlorpheniramine-hydrocodone 8 mg-10 mg/5 mL oral suspension, extended release: 5 mL, PO, q12 hrs, PRN: for cough, # 120 mL, 0 Refill(s), Type: Maintenance, Pharmacy: [facility], 5 mL po q12 hrs,PRN:for cough  levothyroxine 112 mcg (0.112 mg) oral tablet: 1 tab(s) ( 112 mcg ), PO, Daily, # 90 tab(s), 3 Refill(s), Type: Maintenance, Pharmacy: [facility], 1 tab(s) po daily  Documented Medications  Documented  Multiple Vitamins oral tablet: 1 tab(s), po, daily, tab(s), 0 Refill(s), Type: Maintenance  Tylenol 325 mg oral tablet: 2 tab(s) ( 650 mg ), PO, q4hr, PRN: for pain, # 120 tab(s), 0 Refill(s), Type: Maintenance  Viactiv Soft Calcium Chews: 2 EA, daily, Instructions: chews, 0 Refill(s), Type: Maintenance  Vitamin D3: ( 2,000 International Unit ), daily, 0 Refill(s), Type: Maintenance  cevimeline 30 mg oral capsule: 1 cap(s) ( 30 mg ), PO, TID, # 90 cap(s), 0 Refill(s), Type: Maintenance  hydroxychloroquine 200 mg oral tablet: 1 tab(s) ( 200 mg ), po, bid, # 180 tab(s), 0 Refill(s), Type: Maintenance  omeprazole 20 mg oral delayed release capsule: 1 cap(s) ( 20 mg ), PO, Daily, # 90 cap(s), 0 Refill(s), Type: Maintenance   Problem list:    All Problems (Selected)  Hypothyroidism / SNOMED CT 041177805 / Confirmed  Hashimoto's Thyroiditis / ICD-9-.2 / Confirmed  Sjogren's disease / SNOMED CT 627787990 / Confirmed      Histories   Past Medical History:    Active  Hashimoto's Thyroiditis (ICD-9-.2): Onset in 1996 at 31 years.  Resolved  Pregnancy (SNOMED CT 099528523):  Resolved in 1991 at 25 years.  Pregnancy (SNOMED CT 848467879):  Resolved in 1992 at 26 years.  Pregnancy (SNOMED CT 643699373):  Resolved in 1997 at 31 years.   Family History:    Cancer  Mother (mother): onset at 71 .  Comments:  3/6/2017 11:01 AM - Dom Novoa MD  unknown primary cancer     Procedure history:    Esophagogastroduodenoscopy (363654104) on 1/30/2018 at 52 Years.  Comments:  2/15/2018 9:07 AM - Manuela Daley  Indication:  History of gastric polyps with low-grade dysplasia.  Esophagogastroduodenoscopy (343838028) on 7/6/2016 at 50 Years.  Comments:  4/20/2017 12:20 PM - Manuela Daley  Dx:  Abdominal pain; dysphagia.  Colonoscopy (749952990) in 2015 at 50 Years.  Cholecystectomy (31480750).  Tonsillectomy (466508456).  Comments:  5/8/2014 10:00 AM - Lisette MORGAN,  Bailee  Childhood   Social History:        Alcohol Assessment            Current, 1 x per month., 1 drinks/episode average.                     Comments:                      03/06/2017 - Bailee Knox LPN                     Rare alcohol      Tobacco Assessment: Denies Tobacco Use      Substance Abuse Assessment: Denies Substance Abuse      Employment and Education Assessment            Employed                     Comments:                      05/08/2014 - Bailee Knox LPN                     [employer]      Home and Environment Assessment            Marital status:  (Living together).  Spouse/Partner name: Eren.  Lives with Children, Spouse.  3               children.  Living situation: Home/Independent.      Nutrition and Health Assessment            Type of diet: Regular.      Exercise and Physical Activity Assessment: Occasional exercise            Exercise type: Walking.      Sexual Assessment            Sexually active: Yes.  Other contraceptive use: VAS.      Other Assessment            First menses age 13.  Regular menses.  Menstrual duration 5-6 days, cycle interval 28 days.  No history of               abnormal Pap smear.        Physical Examination   Vital Signs   5/29/2018 12:56 PM CDT Temperature Tympanic 98.4 DegF    Peripheral Pulse Rate 80 bpm    Pulse Site Radial artery    HR Method Manual    Systolic Blood Pressure 110 mmHg    Diastolic Blood Pressure 56 mmHg  LOW    Mean Arterial Pressure 74 mmHg    BP Site Right arm    BP Method Manual      Measurements from flowsheet : Measurements   5/29/2018 12:56 PM CDT Height Measured - Standard 67 in    Weight Measured - Standard 148.0 lb    BSA 1.78 m2    Body Mass Index 23.18 kg/m2      General:  Alert and oriented, No acute distress.    Eye:  Pupils are equal, round and reactive to light, Normal conjunctiva.    HENT:  Normocephalic, Tympanic membranes are clear, Normal hearing, Oral mucosa is moist, No pharyngeal erythema.    Neck:  Supple,  Non-tender, No lymphadenopathy, No thyromegaly.    Respiratory:  Lungs are clear to auscultation, Respirations are non-labored, Breath sounds are equal.    Cardiovascular:  Normal rate, Regular rhythm.    Gastrointestinal:  Soft, Non-tender, Non-distended, Normal bowel sounds, No organomegaly.    Musculoskeletal:  Normal range of motion, No deformity.    Integumentary:  Warm, Dry.    Neurologic:  Alert, Oriented.       Review / Management   Results review:  hgb 11.3 at gyn appt.       Impression and Plan   Diagnosis     Sjogren's disease (OQN50-CJ M35.00).     Hashimoto's Thyroiditis (OUR49-AV E06.3).     Menorrhagia (ZUV20-XO N92.0).     Preop examination (ZDJ31-VT Z01.818).     Condition:  Patient is stable and appropriate to proceed with surgery..

## 2022-02-15 NOTE — NURSING NOTE
Quick Intake Entered On:  6/1/2020 9:31 AM CDT    Performed On:  6/1/2020 9:31 AM CDT by Josephine Navarro RN               Summary   Chief Complaint :   Wt, BP   Menstrual Status :   Menarcheal   Weight Measured :   144.2 lb(Converted to: 144 lb 3 oz, 65.41 kg)    Systolic Blood Pressure :   112 mmHg   Diastolic Blood Pressure :   73 mmHg   Mean Arterial Pressure :   86 mmHg   Peripheral Pulse Rate :   78 bpm   BP Site :   Right arm   Pulse Site :   Brachial artery   BP Method :   Electronic   HR Method :   Electronic   Josephine Navarro RN - 6/1/2020 9:31 AM CDT

## 2022-02-15 NOTE — NURSING NOTE
Seen for COVID testing at [facility] per  _ MARINA    Fax Result to: _    O2 Sat = _96%  (Children under 12 do not require O2 sat)    Specimen sent to:  _ [facility]    PUI form faxed to _ [facility].

## 2022-02-15 NOTE — LETTER
(Inserted Image. Unable to display)   [address] [place] [address]  [phone] (phone) [phone] (fax)  December 12, 2021      MELY GARCIA      [address] [place] [id number]      Dear MELY,    Thank you for selecting [facility] for your healthcare needs. Below you will find the results of the recent tests done at our clinic.     Your lab results are in range. Let me know if you have questions.    Result Name Current Result Previous Result Reference Range   TSH (mIU/L)  1.74 12/10/2021  3.38 6/2/2021 0.40 - 4.50       Please contact me or my assistant at [phone] if you have any questions or concerns.     Sincerely,        Dom Novoa M.D.

## 2022-02-15 NOTE — TELEPHONE ENCOUNTER
---------------------  From: Halima Tabor CMA (Phone Messages Pool ([id number]_[facility]))   To: NANI GARCIA    Sent: 3/13/2020 9:50:12 AM CDT  Subject: RE: can't sleep     Good Morning Nani,    I would advise coming in for an appointment to discuss this with Dr. Novoa. You can call our clinic at [phone] to make an appointment.     Halima Frazier CMA       ---------------------  From: NANI GARCIA  To: [facility]  Sent: 03/13/2020 09:46 a.m. CDT  Subject: can t sleep  Dr. Novoa,  I was wondering if you are able to help with my problem through this messaging service/  however I totally understand if this is something I should make an appointment to see you in the office.    I am having great difficulty sleeping for a while- I have tried occasionally to use OTC  Unisom at 1/2 dose  (which helps a lot) or Benadryl 1/2 dose but I have Sjogren s and these actually make my dry mouth just absolutely miserable and I also take the Civemeline too which probably isn t good to take with Unisom or Benadryl.  I have tried OTC melatonin  but I really feel very nauseous with this and it doesn t help me sleep at all.  Is there some RX sleeping aid that could be prescribed for me on a trial basis?    Again - if it is better for me to make an appointment to see you that is fine too.  Thank you very much.  fromNani

## 2022-02-15 NOTE — TELEPHONE ENCOUNTER
Entered by Dom Novoa MD on March 17, 2020 2:57:06 PM CDT  Shashi Cardoza for being understanding in this difficult time. Mj is the best. Hope you are doing better soon. Reach out anytime.  Thanks,  Graciela Novoa      ---------------------  From: Maite Rosenberg CMA (Phone Messages Pool ([id number]_[facility])   To: Dom Novoa MD; Uche Richardson MD; Mj Villagran PA-C;     Sent: 3/17/2020 2:22:19 PM CDT  Subject: FW: Thank You! Mj was so helpful. :)           ---------------------  From: NANI GARCIA  To: [facility]  Sent: 03/17/2020 02:18 p.m. CDT  Subject: Thank You! Mj was so helpful. :)  Thank You!  Thank you to Mj Villagran/ Dr. Novoa and EVERYONE there at your clinic for your help and dedication to your patients and our community! I appreciate all of you so much!  From, Nani Garcia---------------------  From: Dom Novoa MD   To: NANI GARCIA    Sent: 3/17/2020 2:57:15 PM CDT  Subject: RE: Thank You! Mj was so helpful. :)

## 2022-02-15 NOTE — PROGRESS NOTES
Patient:   MELY GARCIA            MRN: [id number]            FIN: [id number]               Age:   53 years     Sex:  Female     :  1965   Associated Diagnoses:   Bilateral hip pain; Left shoulder pain; Well adult exam; Hashimoto's Thyroiditis; Sjogren's disease   Author:   Dom Novoa MD      Visit Information   Visit type:  Annual exam.    Source of history:  Self.    History limitation:  None.       Chief Complaint   2019 8:08 AM CDT     Physcial and TSH med check; review labs; other concerns      Well Adult History   Well Adult History             The patient presents for well adult exam.  The patient's general health status is described as good.  The patient's diet is described as balanced.  Exercise: routine.  Associated symptoms consist of none.  Last menstrual period: LMP 2018.  Medical encounters: none.     patient with ongoing left shoulder pain, PT helped with some pain, ROM is still limited, cannot abduct above 90 degrees, remembers an injury when a dog she was walking bolted while she was holding the leash in her left hand   discussed Sjogrens, following with Dr Murray, noting increased bilateral hip pain, decreased dose to plaquenil once a day, reports that she worries about the side effect of the autoimmune medications         Review of Systems   ROS reviewed as documented in chart      Health Status   Allergies:    Allergic Reactions (Selected)  Severe  Codeine (Nausea and vomiting)  Severity Not Documented  Augmentin (Rash)   Medications:  (Selected)   Prescriptions  Prescribed  Xopenex HFA 45 mcg/inh inhalation aerosol: 2 puff(s), inh, q4 hrs, PRN: as needed for wheezing, # 1 EA, 10 Refill(s), Type: Maintenance, Pharmacy: [facility]  chlorpheniramine-hydrocodone 8 mg-10 mg/5 mL oral suspension, extended release: 5 mL, PO, q12 hrs, PRN: for cough, # 120 mL, 0 Refill(s), Type: Maintenance, Pharmacy: [facility], 5 mL po q12 hrs,PRN:for cough  levothyroxine 112 mcg (0.112 mg)  oral tablet: 1 tab(s) ( 112 mcg ), PO, Daily, # 90 tab(s), 3 Refill(s), Type: Maintenance, Pharmacy: [facility], 1 tab(s) po daily  Documented Medications  Documented  Multiple Vitamins oral tablet: 1 tab(s), po, daily, tab(s), 0 Refill(s), Type: Maintenance  Tylenol 325 mg oral tablet: 2 tab(s) ( 650 mg ), PO, q4hr, PRN: for pain, # 120 tab(s), 0 Refill(s), Type: Maintenance  Viactiv Soft Calcium Chews: 2 EA, daily, Instructions: chews, 0 Refill(s), Type: Maintenance  cevimeline 30 mg oral capsule: 1 cap(s) ( 30 mg ), PO, TID, # 90 cap(s), 0 Refill(s), Type: Maintenance  hydroxychloroquine 200 mg oral tablet: 1 tab(s) ( 200 mg ), po, bid, # 180 tab(s), 0 Refill(s), Type: Maintenance  omeprazole 20 mg oral delayed release capsule: 1 cap(s) ( 20 mg ), PO, Daily, # 90 cap(s), 0 Refill(s), Type: Maintenance   Problem list:    All Problems  Hypothyroidism / SNOMED CT 189975133 / Confirmed  Hashimoto's Thyroiditis / ICD-9-.2 / Confirmed  Sjogren's disease / SNOMED CT 873643870 / Confirmed  Resolved: Pregnancy / SNOMED CT 654299139  Resolved: Pregnancy / SNOMED CT 417661389  Resolved: Pregnancy / SNOMED CT 873677247      Histories   Past Medical History:    Active  Hashimoto's Thyroiditis (ICD-9-.2): Onset in  at 31 years.  Hypothyroidism (SNOMED CT 750446744)  Sjogren's disease (SNOMED CT 219694636)  Resolved  Pregnancy (SNOMED CT 822692026):  Resolved in  at 25 years.  Pregnancy (SNOMED CT 257837730):  Resolved in  at 26 years.  Pregnancy (SNOMED CT 212585779):  Resolved in  at 31 years.   Family History:    Cancer  Mother (): onset at 71 .  Comments:  3/6/2017 11:01 AM Dom Chang MD  unknown primary cancer     Procedure history:    Colonoscopy ([id number]) on 3/5/2019 at 53 Years.  Comments:  3/14/2019 11:45 AM CDT - Manuela Daley  Indication:  Previous adenomatous polyp(s).  Sedation:  MAC.  Findings:  Long redundant colon.  External hemorrhoids.  Recommendation:   Repeat in 5 years.  Esophagogastroduodenoscopy (574037619) on 1/30/2018 at 52 Years.  Comments:  2/15/2018 9:07 AM RICARDO - Manuela Daley  Indication:  History of gastric polyps with low-grade dysplasia.  Esophagogastroduodenoscopy (042645503) on 7/6/2016 at 50 Years.  Comments:  4/20/2017 12:20 PM DARIONT - Manuela Daley  Dx:  Abdominal pain; dysphagia.  Colonoscopy (831560301) in 2015 at 50 Years.  Cholecystectomy (59782182).  Tonsillectomy (825138070).  Comments:  5/8/2014 10:00 AM Bailee Orellana LPN  Childhood   Social History:        Alcohol Assessment            Current, 1 x per month., 1 drinks/episode average.                     Comments:                      03/06/2017 - Bailee Knox LPN                     Rare alcohol      Tobacco Assessment: Denies Tobacco Use            Never (less than 100 in lifetime)      Substance Abuse Assessment: Denies Substance Abuse            Never      Employment and Education Assessment            Employed                     Comments:                      05/08/2014 - Bailee Knox LPN                     [employer]      Home and Environment Assessment            Marital status:  (Living together).  Spouse/Partner name: Eren.  Lives with Children, Spouse.  3               children.  Living situation: Home/Independent.      Nutrition and Health Assessment            Type of diet: Regular.      Exercise and Physical Activity Assessment: Occasional exercise            Exercise type: Walking.      Sexual Assessment            Sexually active: Yes.  Other contraceptive use: VAS.      Other Assessment            First menses age 13.  Regular menses.  Menstrual duration 5-6 days, cycle interval 28 days.  No history of               abnormal Pap smear.      Physical Examination   Vital Signs   6/5/2019 8:08 AM CDT Peripheral Pulse Rate 80 bpm    HR Method Manual    Systolic Blood Pressure 100 mmHg    Diastolic Blood Pressure 62 mmHg    Mean Arterial Pressure 75 mmHg    BP  Method Manual      Measurements from flowsheet : Measurements   6/5/2019 8:08 AM CDT Height Measured - Standard 67 in    Weight Measured - Standard 143.6 lb    BSA 1.75 m2    Body Mass Index 22.49 kg/m2      General:  Alert and oriented, No acute distress.    Eye:  Pupils are equal, round and reactive to light, Extraocular movements are intact, Normal conjunctiva.    HENT:  Normocephalic, Tympanic membranes are clear, Oral mucosa is moist, No pharyngeal erythema.    Neck:  Supple, Non-tender, No lymphadenopathy, No thyromegaly.    Respiratory:  Lungs are clear to auscultation.    Cardiovascular:  Normal rate, Regular rhythm.    Breast:  No mass, No tenderness, No discharge.    Gastrointestinal:  Soft, Non-tender, Non-distended, No organomegaly.    Musculoskeletal:  left shoulder with abduction to 90 degrees, pain with passive motion, no deformity, some pain in groin and lateral hips bilaterally with ROM.    Integumentary:  Warm, Dry, Pink, No rash, no concerning lesions.    Neurologic:  Alert, Oriented, Normal sensory.    Psychiatric:  Cooperative, Appropriate mood & affect.       Review / Management   Results review:  Lab results   5/31/2019 8:18 AM CDT Glucose Level 92 mg/dL    TSH 0.73 mIU/L   .       Impression and Plan   Diagnosis     Well adult exam (JZL03-PD Z00.00).     Course:  Progressing as expected.    Patient Instructions:       Counseled: Patient, BMI, diet, and exercise.    Diagnosis     Bilateral hip pain (DNO73-BQ M25.551).     Left shoulder pain (VZK91-AJ M25.512).     Orders     Orders   Requests (Consults / Referrals):  Referral (Request) (Order): 6/5/2019 8:40 AM CDT, Referred to: Orthopaedics, Referred to: [facility], Left shoulder pain  Bilateral hip pain.     Diagnosis     Hashimoto's Thyroiditis (WUH82-LP E06.3).     Sjogren's disease (YUD49-LN M35.00).     Orders     Orders (Selected)   Outpatient Orders  Ordered  Return to Clinic (Request): RFV: Annual px, Return in 1  year  Prescriptions  Prescribed  levothyroxine 112 mcg (0.112 mg) oral tablet: = 1 tab(s) ( 112 mcg ), PO, Daily, # 90 tab(s), 3 Refill(s), Type: Maintenance, Pharmacy: [facility], 1 tab(s) Oral daily.

## 2022-02-15 NOTE — TELEPHONE ENCOUNTER
---------------------  From: Karine Pabon CMA (Phone Messages Pool ([id number]_[facility]))   To: Mj Villagran PA-C;     Sent: 3/17/2020 10:32:50 AM CDT  Subject: FW: can't sleep     Returned Call  Time: 9:07 am  Note:  Pt called back today as she had an apt for Monday that was cancelled. Would you be willing to prescribe something as a trial medication. Notes that she feels she is having trouble with anxiety with everything going on and making this sleeping condition worse. Please advise.     [facility]      ---------------------  From: NANI GARCIA  To: [facility]  Sent: 03/13/2020 10:00 a.m. CDT  Subject: RE: can t sleep  Thank you for your quick response??  ---------------------  From: Halima Tabor CMA (Phone Messages Pool ([id number]_[facility]))  To: NANI GARCIA  Sent: 3/13/2020 9:50:12 AM CDT  Subject: RE: can t sleep       Good Morning Nani,       I would advise coming in for an appointment to discuss this with Dr. Novoa. You can call our clinic at [phone] to make an appointment.       Take Halima Chavez CMA            ---------------------  From: NANI GARCIA  To: [facility]  Sent: 03/13/2020 09:46 a.m. CDT  Subject: can t sleep  Dr. Novoa,  I was wondering if you are able to help with my problem through this messaging service/  however I totally understand if this is something I should make an appointment to see you in the office.  I am having great difficulty sleeping for a while- I have tried occasionally to use OTC  Unisom at 1/2 dose  (which helps a lot) or Benadryl 1/2 dose but I have Sjogren s and these actually make my dry mouth just absolutely miserable and I also take the Civemeline too which probably isn t good to take with Unisom or Benadryl.  I have tried OTC melatonin  but I really feel very nauseous with this and it doesn t help me sleep at all.  Is there some RX sleeping aid that could be prescribed  for me on a trial basis?  Again - if it is better for me to make an appointment to see you that is fine too.  Thank you very much.  from, Nani Gibson---------------------  From: Mj Villagran PA-C   To: Phone Messages Pool ([id number]_[place] [facility] [place]);     Sent: 3/17/2020 12:01:28 PM CDT  Subject: RE: can't sleep     LMTCB    DAMIANReturned Call  Time: 11:35 am  Note:  Called & left a message for pt on her call back about setting up a Telemedicine visit.Returned Call  Time: 12:11 pm  Note:  Pt called back stating that DAMIAN took care of this last week. States that she has taken 25 mg daily for a week and will increase to 1 tab daily. She will let us know how she is doing after a month so we can send in refills for her.

## 2022-02-15 NOTE — TELEPHONE ENCOUNTER
---------------------  From: Keyana Suarez (Phone Messages Pool ([id number]_[facility]))   To: Luzmaria Crenshaw;     Sent: 1/24/2019 12:21:06 PM CST  Subject: Returning call      Phone Message    PCP STEPHY      Time of Call  11:38      Person Calling Pt  Phone number [phone] Ok to LDM or home [phone]       Returned call at _12:16    Note  Pt left message stating that DS was to call her about 9 this am, she will be home after 3 pm today.  Called to pt informed that DS is not in today, but will send message.  Pt states she will be available this afternoon and then again on Monday.    Last office visit and reason _5/29/18 Pre-op---------------------  From: Luzmaria Crenshaw   To: Phone Messages [place] ([id number]_[place] [facility] [place]);     Sent: 1/24/2019 2:49:02 PM CST  Subject: RE: Returning call     Call was for bank health assessment review, pt called and discussed.Noted

## 2022-02-15 NOTE — NURSING NOTE
Comprehensive Intake Entered On:  6/8/2020 8:03 AM CDT    Performed On:  6/8/2020 7:59 AM CDT by Halima Tabor CMA               Summary   Chief Complaint :   Video Visit: TSH med check, review labs, consent for video visit   Menstrual Status :   Menarcheal   Halima Tabor CMA - 6/8/2020 7:59 AM CDT   Health Status   Allergies Verified? :   Yes   Medication History Verified? :   Yes   Immunizations Current :   Yes   Medical History Verified? :   Yes   Pre-Visit Planning Status :   Completed   Tobacco Use? :   Never smoker   Halima Tabor CMA - 6/8/2020 7:59 AM CDT   Consents   Consent for Immunization Exchange :   Consent Granted   Consent for Immunizations to Providers :   Consent Granted   Halima Tabor CMA - 6/8/2020 7:59 AM CDT   Meds / Allergies   (As Of: 6/8/2020 8:03:27 AM CDT)   Allergies (Active)   Augmentin  Estimated Onset Date:   Unspecified ; Reactions:   rash ; Created By:   Karine Pabon CMA; Reaction Status:   Active ; Category:   Drug ; Substance:   Augmentin ; Type:   Allergy ; Updated By:   Karine Pabon CMA; Reviewed Date:   6/8/2020 8:02 AM CDT      codeine  Estimated Onset Date:   Unspecified ; Reactions:   Nausea, Vomiting ; Created By:   Yumiko Suarez CMA; Reaction Status:   Active ; Category:   Drug ; Substance:   codeine ; Type:   Allergy ; Severity:   Severe ; Updated By:   Yumiko Suarez CMA; Reviewed Date:   6/8/2020 8:02 AM CDT        Medication List   (As Of: 6/8/2020 8:03:28 AM CDT)   Prescription/Discharge Order    chlorpheniramine-hydrocodone  :   chlorpheniramine-hydrocodone ; Status:   Prescribed ; Ordered As Mnemonic:   chlorpheniramine-hydrocodone 8 mg-10 mg/5 mL oral suspension, extended release ; Simple Display Line:   5 mL, PO, q12 hrs, PRN: for cough, 120 mL, 0 Refill(s) ; Ordering Provider:   Dom Novoa MD; Catalog Code:   chlorpheniramine-hydrocodone ; Order Dt/Tm:   4/20/2017 1:33:08 PM CDT          levalbuterol  :   levalbuterol ; Status:   Prescribed ; Ordered As  Mnemonic:   Xopenex HFA 45 mcg/inh inhalation aerosol ; Simple Display Line:   2 puff(s), inh, q4 hrs, 1 EA, PRN: as needed for wheezing ; Ordering Provider:   Dom Novoa MD; Catalog Code:   levalbuterol ; Order Dt/Tm:   12/21/2012 1:18:49 PM CST          levothyroxine  :   levothyroxine ; Status:   Prescribed ; Ordered As Mnemonic:   levothyroxine 112 mcg (0.112 mg) oral tablet ; Simple Display Line:   112 mcg, 1 tab(s), PO, Daily, 30 tab(s), 0 Refill(s) ; Ordering Provider:   Dom Novoa MD; Catalog Code:   levothyroxine ; Order Dt/Tm:   5/27/2020 10:10:47 AM CDT          sertraline  :   sertraline ; Status:   Prescribed ; Ordered As Mnemonic:   sertraline 50 mg oral tablet ; Simple Display Line:   50 mg, 1 tab(s), Oral, daily, 30 tab(s), 0 Refill(s) ; Ordering Provider:   Mj Villagran PA-C; Catalog Code:   sertraline ; Order Dt/Tm:   3/17/2020 1:19:24 PM CDT            Home Meds    acetaminophen  :   acetaminophen ; Status:   Documented ; Ordered As Mnemonic:   Tylenol 325 mg oral tablet ; Simple Display Line:   650 mg, 2 tab(s), PO, q4hr, PRN: for pain, 120 tab(s), 0 Refill(s) ; Catalog Code:   acetaminophen ; Order Dt/Tm:   3/6/2017 9:07:21 AM CST          cevimeline  :   cevimeline ; Status:   Documented ; Ordered As Mnemonic:   cevimeline 30 mg oral capsule ; Simple Display Line:   30 mg, 1 cap(s), PO, TID, 90 cap(s), 0 Refill(s) ; Catalog Code:   cevimeline ; Order Dt/Tm:   3/20/2018 3:01:27 PM CDT          hydroxychloroquine  :   hydroxychloroquine ; Status:   Documented ; Ordered As Mnemonic:   hydroxychloroquine 200 mg oral tablet ; Simple Display Line:   200 mg, 1 tab(s), po, daily, 180 tab(s), 0 Refill(s) ; Catalog Code:   hydroxychloroquine ; Order Dt/Tm:   3/20/2018 3:00:18 PM CDT          multivitamin  :   multivitamin ; Status:   Documented ; Ordered As Mnemonic:   Multiple Vitamins oral tablet ; Simple Display Line:   1 tab(s), po, daily, tab(s) ; Catalog Code:   multivitamin ; Order  Dt/Tm:   3/22/2011 9:01:05 AM CDT          multivitamin with minerals  :   multivitamin with minerals ; Status:   Documented ; Ordered As Mnemonic:   Viactiv Soft Calcium Chews ; Simple Display Line:   2 EA, daily, chews, 0 Refill(s) ; Catalog Code:   multivitamin with minerals ; Order Dt/Tm:   3/6/2017 9:06:54 AM CST          omeprazole  :   omeprazole ; Status:   Documented ; Ordered As Mnemonic:   omeprazole 20 mg oral delayed release capsule ; Simple Display Line:   20 mg, 1 cap(s), PO, Daily, 90 cap(s), 0 Refill(s) ; Catalog Code:   omeprazole ; Order Dt/Tm:   4/26/2018 10:03:03 AM CDT            ID Risk Screen   Recent Travel History :   No recent travel   Family Member Travel History :   No recent travel   Other Exposure to Infectious Disease :   Unknown   Halima Tabor CMA - 6/8/2020 7:59 AM CDT

## 2022-02-15 NOTE — PROGRESS NOTES
Patient:   MELY GARCIA            MRN: [id number]            FIN: [id number]               Age:   54 years     Sex:  Female     :  1965   Associated Diagnoses:   Hypothyroidism; Chronic GERD; Generalized anxiety disorder   Author:   Dom Novoa MD      Visit Information   Participants in room during visit:  patient, self   Location of patient:  home  Location of provider:  home  Video Start Time:  0810am  Video End Time:   827am  Video visit via [employer]    Today's visit was conducted via video conference due to the COVID-19 pandemic.  The patient's consent to proceed with a video visit has been obtained and documented.      Chief Complaint   2020 7:59 AM CDT     Video Visit: TSH med check, review labs, consent for video visit      History of Present Illness   Patient is being evaluated via a billable video visit.    1. thyroid follow up, reviewed TSH, well controlled, refills for one year, no concerns per patient, tolerating well  2. GERD discussed, has followed with GI, had been on omeprazole, tried discontinuing PPI but symptoms returned, has been using 40mg tablets that she had leftover and symptoms have resolved, would like to resume prescription  3. mammogram discussed, had not heard from radiology, noted that was normal, letter from me sent on the   4. anxiety discussed has struggled with current pandemic, tried sertraline but rhematologist mentioned concerns about QT prolongation so stopped, feels like she is coping better now and not as anxious         Health Status   Allergies:    Allergic Reactions (Selected)  Severe  Codeine (Nausea and vomiting)  Severity Not Documented  Augmentin (Rash)   Medications:  (Selected)   Prescriptions  Prescribed  Xopenex HFA 45 mcg/inh inhalation aerosol: 2 puff(s), inh, q4 hrs, PRN: as needed for wheezing, # 1 EA, 10 Refill(s), Type: Maintenance, Pharmacy: [facility]  chlorpheniramine-hydrocodone 8 mg-10 mg/5 mL oral suspension, extended release: 5  mL, PO, q12 hrs, PRN: for cough, # 120 mL, 0 Refill(s), Type: Maintenance, Pharmacy: [facility], 5 mL po q12 hrs,PRN:for cough  levothyroxine 112 mcg (0.112 mg) oral tablet: = 1 tab(s) ( 112 mcg ), PO, Daily, # 90 tab(s), 3 Refill(s), Type: Maintenance, Pharmacy: [facility], 1 tab(s) Oral daily, 67, in, 06/05/19 8:08:00 CDT, Height Measured, Weight Measured  sertraline 50 mg oral tablet: = 1 tab(s) ( 50 mg ), Oral, daily, # 30 tab(s), 0 Refill(s), Type: Maintenance, Pharmacy: [facility], 1 tab(s) Oral daily  Documented Medications  Documented  Multiple Vitamins oral tablet: 1 tab(s), po, daily, tab(s), 0 Refill(s), Type: Maintenance  Tylenol 325 mg oral tablet: 2 tab(s) ( 650 mg ), PO, q4hr, PRN: for pain, # 120 tab(s), 0 Refill(s), Type: Maintenance  Viactiv Soft Calcium Chews: 2 EA, daily, Instructions: chews, 0 Refill(s), Type: Maintenance  cevimeline 30 mg oral capsule: 1 cap(s) ( 30 mg ), PO, TID, # 90 cap(s), 0 Refill(s), Type: Maintenance  hydroxychloroquine 200 mg oral tablet: = 1 tab(s) ( 200 mg ), po, daily, # 180 tab(s), 0 Refill(s), Type: Maintenance  omeprazole 20 mg oral delayed release capsule: 1 cap(s) ( 20 mg ), PO, Daily, # 90 cap(s), 0 Refill(s), Type: Maintenance   Problem list:    All Problems  Sjogren's disease / SNOMED CT 327536029 / Confirmed  Hashimoto's Thyroiditis / ICD-9-.2 / Confirmed  Hypothyroidism / SNOMED CT 161727145 / Confirmed      Histories   Past Medical History:    Active  Hashimoto's Thyroiditis (ICD-9-.2): Onset in 1996 at 31 years.  Hypothyroidism (SNOMED CT 860885299)  Sjogren's disease (SNOMED CT 861237138)  Resolved  Pregnancy (SNOMED CT 810092871):  Resolved in 1991 at 25 years.  Pregnancy (SNOMED CT 421603097):  Resolved in 1992 at 26 years.  Pregnancy (SNOMED CT 071711118):  Resolved in 1997 at 31 years.   Social History:        Alcohol Assessment            Current, Liquor (Hard) (1.5 oz), 1-2 times per month, 1 drinks/episode average.  2  drinks/episode maximum.               Ready to change: No.                     Comments:                      03/06/2017 - Bailee Knox LPN                     Rare alcohol      Tobacco Assessment: Denies Tobacco Use            Never (less than 100 in lifetime)      Substance Abuse Assessment: Denies Substance Abuse            Never      Employment and Education Assessment            Employed, Highest education level: BSN.                     Comments:                      05/08/2014 - Bailee Knox LPN                     [employer]      Home and Environment Assessment            Marital status:  (Living together).  Spouse/Partner name: Eren.  Lives with Children, Spouse.  3               children.  Living situation: Home/Independent.  Injuries/Abuse/Neglect in household: No.  Feels unsafe at               home: No.  Family/Friends available for support: Yes.      Nutrition and Health Assessment            Type of diet: Regular.  Wants to lose weight: No.  Sleeping concerns: No.  Feels highly stressed: No.      Exercise and Physical Activity Assessment: Occasional exercise            Exercise type: Walking.      Sexual Assessment            Sexually active: Yes.  Other contraceptive use: VAS.      Other Assessment            First menses age 13.  Regular menses.  Menstrual duration 5-6 days, cycle interval 28 days.  No history of               abnormal Pap smear.        Physical Examination   General:  Alert and oriented, No acute distress.    Eye:  Normal conjunctiva.    HENT:  Oral mucosa is moist.    Neck:  Supple.    Respiratory:  Respirations are non-labored.    Psychiatric:  Cooperative, Appropriate mood & affect, Normal judgment.       Impression and Plan   Diagnosis     Hypothyroidism (PCI25-JC E06.3).     Course:  wel controlled, continue current medications, refills for one year, recheck labs in one year.    Orders     Orders (Selected)   Prescriptions  Prescribed  levothyroxine 112 mcg (0.112  mg) oral tablet: = 1 tab(s) ( 112 mcg ), PO, Daily, # 90 tab(s), 3 Refill(s), Type: Maintenance, Pharmacy: [facility], 1 tab(s) Oral daily, 67, in, 06/05/19 8:08:00 CDT, Height Measured, Weight Measured.     Diagnosis     Chronic GERD (ATJ07-CK K21.9).     Course:  failed attempt at being off PPI, will resume omeprazole.    Orders     Orders (Selected)   Prescriptions  Prescribed  omeprazole 20 mg oral delayed release capsule: = 1 cap(s) ( 20 mg ), PO, Daily, # 90 cap(s), 3 Refill(s), Type: Maintenance, Pharmacy: [facility], 1 cap(s) Oral daily, 67, in, 06/05/19 8:08:00 CDT, Height Measured, 144.2, lb, 06/01/20 9:31:00 CDT, Weight Measured.     Diagnosis     Generalized anxiety disorder (ZDD17-CZ F41.1).     Course:  currently adequately controlled without medication, reassured patient that sertraline was not found to prolong QT interval, no drug interaction noted, if symptoms worsen can restart sertraline or discuss alternatives fluoxetine or paroxetine, avoid citalopram/escitalopram.       Review / Management   Results review:  Lab results   6/1/2020 9:27 AM CDT Glucose Level 86 mg/dL    Cholesterol 166 mg/dL    Non-    HDL 57 mg/dL    Chol/HDL Ratio 2.9    LDL 90    Triglyceride 93 mg/dL    TSH 1.52 mIU/L   .

## 2022-02-15 NOTE — TELEPHONE ENCOUNTER
---------------------  From: Dom Novoa MD   To: NANI GARCIA    Sent: 6/2/2020 7:55:27 AM CDT  Subject: Patient Message - Results Notification     Nani,  Here is a copy of your labs which are all very good. We'll discuss at your visit on the 8th.  Thanks,  Graciela Novoa     Results:  Date Result Name Value Ref Range   6/1/2020 9:27 AM Glucose Level 86 mg/dL (65 - 99)   6/1/2020 9:27 AM Cholesterol 166 mg/dL ( - <200)   6/1/2020 9:27 AM Non-HDL Cholesterol 109 ( - <130)   6/1/2020 9:27 AM HDL 57 mg/dL (> OR = 50 - )   6/1/2020 9:27 AM Cholesterol/HDL Ratio 2.9 ( - <5.0)   6/1/2020 9:27 AM LDL 90    6/1/2020 9:27 AM Triglyceride 93 mg/dL ( - <150)   6/1/2020 9:27 AM TSH 1.52 mIU/L

## 2022-02-15 NOTE — LETTER
(Inserted Image. Unable to display)   [address] [facility] [address]  [phone] (phone) [phone] (fax)  Pinky 15, 2021        MELY GARCIA  [address] [place] [id number]      Dear MELY,      Thank you for selecting [facility] for your [facility] needs.      This letter is to inform you that we have received a copy of your mammogram results.  Your results were normal.  You will also receive notice of your results from the radiologist within 7-10 days.  This letter is to let you know I have also received a copy and it is filed in your clinic chart.      Please plan on having your next mammogram done in 12 months.       Please contact me or my assistant at [phone] if you have any questions or concerns.     Sincerely,      Dom Novoa MD

## 2022-02-15 NOTE — TELEPHONE ENCOUNTER
---------------------  From: Dom Novoa MD   To: RADHA MELY R    Sent: 6/3/2021 9:53:48 AM CDT  Subject: results       Ronnie Cardoza,  Results look good and are listed below. We'll see you next week.  Thanks,  Graciela Novoa      Results:  Date Result Name Value Ref Range   6/2/2021 7:55 AM Glucose Level 88 mg/dL (65 - 99)   6/2/2021 7:55 AM Cholesterol 168 mg/dL ( - <200)   6/2/2021 7:55 AM Non-HDL Cholesterol 111 ( - <130)   6/2/2021 7:55 AM HDL 57 mg/dL (> OR = 50 - )   6/2/2021 7:55 AM Cholesterol/HDL Ratio 2.9 ( - <5.0)   6/2/2021 7:55 AM LDL 91    6/2/2021 7:55 AM Triglyceride 100 mg/dL ( - <150)   6/2/2021 7:55 AM TSH 3.38 mIU/L

## 2022-02-15 NOTE — NURSING NOTE
CAGE Assessment Entered On:  6/5/2019 9:02 AM CDT    Performed On:  6/5/2019 9:01 AM CDT by Halima Tabor CMA               Assessment   Have you ever felt you should cut down on your drinking :   No   Have people annoyed you by criticizing your drinking :   No   Have you ever felt bad or guilty about your drinking :   No   Have you ever taken a drink first thing in the morning to steady your nerves or get rid of a hangover (Eye-opener) :   No   CAGE Score :   0    Halima Tabor CMA - 6/5/2019 9:01 AM CDT

## 2022-02-15 NOTE — NURSING NOTE
"Comprehensive Intake Entered On:  2020 12:27 PM CST    Performed On:  2020 12:15 PM CST by Onelia Garrido               Summary   Chief Complaint :   Occassional burning pain when urinating since 20. No discharge, no blood. Can \"feel\" bladder describing it as \"twitchy\" but not achy. Has increased frequency but also has been pushing fluids.   Menstrual Status :   Menarcheal   Weight Measured :   146.8 lb(Converted to: 146 lb 13 oz, 66.587 kg)    Height/Length Estimated :   67 in(Converted to: 5 ft 7 in, 170.18 cm)    Systolic Blood Pressure :   112 mmHg   Diastolic Blood Pressure :   76 mmHg   Mean Arterial Pressure :   88 mmHg   Peripheral Pulse Rate :   72 bpm   BP Site :   Right arm   BP Method :   Manual   HR Method :   Manual   Temperature Tympanic :   97.6 DegF(Converted to: 36.4 DegC)  (LOW)    Onelia Garrido - 2020 12:15 PM CST   Health Status   Allergies Verified? :   Yes   Medication History Verified? :   Yes   Immunizations Current :   Yes   Medical History Verified? :   Yes   Pre-Visit Planning Status :   Completed   Tobacco Use? :   Never smoker   GarridoOnelia - 2020 12:15 PM CST   Demographics   Last Name :   Arthur   Address :   [address]   First Name :   Nani Rosas Initial :   R   Responsible Party Date of Birth () :   1965 CDT   City :   [place]   State :   [place]   Zip Code :   [address]   Contact Relationship to Patient (other) :   Patient   Onelia Garrido - 2020 12:15 PM CST   Contact Informtion Grid   Name :    Markie              Relationship to Patient :    Spouse                Andreina Garridobeth - 2020 12:15 PM CST         Providers Grid   Provider Name :    Dr. Suleman Thornton     Provider Specialty :    Dental   Optic   GYN   Rheumatology     Comments :    [place]                Hema Onelia - 2020 12:15 PM CST  Onelia Garrido - 2020 12:15 PM CST  " Onelia Garrido - 12/30/2020 12:15 PM CST  Onelia Garrido - 12/30/2020 12:15 PM CST      Provider Name :    Dr. Jayne Harvey              Provider Specialty :    Gastro              Comments :                    Onelia Garrido - 12/30/2020 12:15 PM CST         Ancillary Services Grid   Name :    Molina Patel              Type of Service :    Pharmacy                Onelia Garrido - 12/30/2020 12:15 PM CST         Consents   Consent for Immunization Exchange :   Consent Granted   Consent for Immunizations to Providers :   Consent Granted   Onelia Garrido - 12/30/2020 12:15 PM CST   Meds / Allergies   (As Of: 12/30/2020 12:27:42 PM CST)   Allergies (Active)   Augmentin  Estimated Onset Date:   Unspecified ; Reactions:   rash ; Created By:   Karine Pabon CMA; Reaction Status:   Active ; Category:   Drug ; Substance:   Augmentin ; Type:   Allergy ; Updated By:   Karine Pabon CMA; Reviewed Date:   12/30/2020 12:22 PM CST      codeine  Estimated Onset Date:   Unspecified ; Reactions:   Nausea, Vomiting ; Created By:   Yumiko Suarez CMA; Reaction Status:   Active ; Category:   Drug ; Substance:   codeine ; Type:   Allergy ; Severity:   Severe ; Updated By:   Yumiko Suarez CMA; Reviewed Date:   12/30/2020 12:22 PM CST        Medication List   (As Of: 12/30/2020 12:27:42 PM CST)   Prescription/Discharge Order    levothyroxine  :   levothyroxine ; Status:   Prescribed ; Ordered As Mnemonic:   levothyroxine 112 mcg (0.112 mg) oral tablet ; Simple Display Line:   112 mcg, 1 tab(s), PO, Daily, 90 tab(s), 3 Refill(s) ; Ordering Provider:   Dom Novoa MD; Catalog Code:   levothyroxine ; Order Dt/Tm:   6/8/2020 8:13:07 AM CDT          omeprazole  :   omeprazole ; Status:   Prescribed ; Ordered As Mnemonic:   omeprazole 20 mg oral delayed release capsule ; Simple Display Line:   20 mg, 1 cap(s), PO, Daily, 90 cap(s), 3 Refill(s) ; Ordering Provider:   Dom Novoa MD; Catalog Code:   omeprazole ; Order Dt/Tm:    6/8/2020 8:15:50 AM CDT            Home Meds    acetaminophen  :   acetaminophen ; Status:   Documented ; Ordered As Mnemonic:   Tylenol 325 mg oral tablet ; Simple Display Line:   650 mg, 2 tab(s), PO, q4hr, PRN: for pain, 120 tab(s), 0 Refill(s) ; Catalog Code:   acetaminophen ; Order Dt/Tm:   3/6/2017 9:07:21 AM CST          cevimeline  :   cevimeline ; Status:   Documented ; Ordered As Mnemonic:   cevimeline 30 mg oral capsule ; Simple Display Line:   30 mg, 1 cap(s), PO, TID, 90 cap(s), 0 Refill(s) ; Catalog Code:   cevimeline ; Order Dt/Tm:   3/20/2018 3:01:27 PM CDT          hydroxychloroquine  :   hydroxychloroquine ; Status:   Documented ; Ordered As Mnemonic:   hydroxychloroquine 200 mg oral tablet ; Simple Display Line:   200 mg, 1 tab(s), po, daily, 180 tab(s), 0 Refill(s) ; Catalog Code:   hydroxychloroquine ; Order Dt/Tm:   3/20/2018 3:00:18 PM CDT          multivitamin  :   multivitamin ; Status:   Documented ; Ordered As Mnemonic:   Multiple Vitamins oral tablet ; Simple Display Line:   1 tab(s), po, daily, tab(s) ; Catalog Code:   multivitamin ; Order Dt/Tm:   3/22/2011 9:01:05 AM CDT          multivitamin with minerals  :   multivitamin with minerals ; Status:   Documented ; Ordered As Mnemonic:   Viactiv Soft Calcium Chews ; Simple Display Line:   2 EA, daily, chews, 0 Refill(s) ; Catalog Code:   multivitamin with minerals ; Order Dt/Tm:   3/6/2017 9:06:54 AM CST            ID Risk Screen   Recent Travel History :   No recent travel   Family Member Travel History :   No recent travel   Other Exposure to Infectious Disease :   Unknown   Onelia Garrido - 12/30/2020 12:15 PM CST   Social History   Social History   (As Of: 12/30/2020 12:27:42 PM CST)   Alcohol:        Current, Liquor (Hard) (1.5 oz), 1-2 times per month, 1 drinks/episode average.  2 drinks/episode maximum.  Ready to change: No.   Comments:  3/6/2017 9:07 AM - Bailee Knox LPN: Rare alcohol   (Last Updated: 6/6/2019 10:12:56 AM  CDT by Manuela Daley)          Tobacco:  Denies Tobacco Use      Never (less than 100 in lifetime)   (Last Updated: 12/30/2020 12:17:01 PM CST by Onelia Garrido)          Electronic Cigarette/Vaping:        Electronic Cigarette Use: Never.   (Last Updated: 12/30/2020 12:16:56 PM CST by Onelia Garrido)          Substance Abuse:  Denies Substance Abuse      Never   (Last Updated: 5/31/2018 9:27:13 AM CDT by Manuela Daley)          Employment/School:        Employed, Highest education level: BSN.   Comments:  5/8/2014 10:01 AM - Bailee Knox LPN: [employer] [facility]   (Last Updated: 6/6/2019 10:14:12 AM CDT by Manuela Daley)          Home/Environment:        Marital status:  (Living together).  Spouse/Partner name: Eren.  Lives with Children, Spouse.  3 children.  Living situation: Home/Independent.  Injuries/Abuse/Neglect in household: No.  Feels unsafe at home: No.  Family/Friends available for support: Yes.   (Last Updated: 6/6/2019 10:13:51 AM CDT by Manuela Daley)          Nutrition/Health:        Type of diet: Regular.  Wants to lose weight: No.  Sleeping concerns: No.  Feels highly stressed: No.   (Last Updated: 6/6/2019 10:13:21 AM CDT by Manuela Daley)          Exercise:  Occasional exercise      Exercise type: Walking.   (Last Updated: 5/8/2014 10:02:43 AM CDT by Bailee Knox LPN)          Sexual:        Sexually active: Yes.  Other contraceptive use: VAS.   (Last Updated: 3/22/2011 9:52:57 AM CDT by Yumiko Suarez CMA)          Other:        First menses age 13.  Regular menses.  Menstrual duration 5-6 days, cycle interval 28 days.  No history of abnormal Pap smear.   (Last Updated: 10/27/2014 2:21:14 PM CDT by Manuela Daley)

## 2022-02-15 NOTE — TELEPHONE ENCOUNTER
Entered by Eduarda Olmos on March 15, 2021 11:06:25 AM CDT  Ronnie Cardoza,    Right now we are vaccinating patients who are 65 and older. When it gets to your age bracket, we will contact you.    Eduarda       ---------------------  From: NANI GARCIA  To: [facility]  Sent: 03/15/2021 09:31 a.m. CDT  Subject: Covid vaccine  Dr. Novoa,    I am wondering if I would be eligible for the Covid vaccine. I have Sjogren s and Hashimoto s/ I take Hydoxycholorquine and I m not sure if this is a qualifying medication. I also provide childcare for my two grandsons ages 20 months and 8 months old. I also could be available upon very short notice for any vaccines that would potentially be wasted at end of the day.  Thank You  from, Nani Garcia---------------------  From: Eduarda Olmos (Phone Messages Pool ([id number]_[facility]))   To: NANI GARCIA    Sent: 3/15/2021 11:06:32 AM CDT  Subject: FW: Covid vaccine

## 2022-02-15 NOTE — LETTER
(Inserted Image. Unable to display)     July 14, 2020      MELY GARCIA  [address], [place] [id number]          Dear MELY,      Thank you for selecting [facility]viously [facility]) for your healthcare needs.      Our records indicate you are due for the following services:     Annual Physical.      To schedule an appointment or if you have further questions, please contact your primary clinic:   [facility]       [phone]   [facility]       [phone]              [facility]     [phone]      Powered by [employer]    Sincerely,    Dom Novoa MD

## 2022-02-15 NOTE — PROGRESS NOTES
"Chief Complaint    Occassional burning pain when urinating since 12/28/20. No discharge, no blood. Can \"feel\" bladder describing it as \"twitchy\" but not achy. Has increased frequency but also has been pushing fluids.  History of Present Illness      Chief complaint as above reviewed and confirmed with patient.  Pt presents to the clinic with concerns re: dysuria, frequency, urgency x 3 days.  No fevers, chills, nausea, vomiting or abdominal pain.  No hx of uti.  NO vaginal itching irrigation or change of discharge.  Review of Systems      Review of systems is negative with the exception of those noted in HPI          Physical Exam   Vitals & Measurements    T: 97.6  F (Tympanic)  HR: 72 (Peripheral)  BP: 112/76     HT: 67 in  WT: 146.8 lb       Pt is in no acute distress.  Appears well.      MM moist, skin turger good.      Lungs CTA      Heart RRR      Abdomen: BS active, soft, non-distended. non tender to light or deep palpation.                           no rebound or peritoneal signs.  no CVAT      UA with pyuria  Assessment/Plan       1. UTI (urinary tract infection) (N39.0)         macrobid as ordered.   Push fluids, rest and ibuprofen or tylenol for comfort.  culture pending.                 Orders:         nitrofurantoin, = 1 cap(s) ( 100 mg ), Oral, bid, x 7 day(s), # 14 cap(s), 0 Refill(s), Type: Acute, Pharmacy: [facility], 1 cap(s) Oral bid,x7 day(s), 67, in, 06/05/19 8:08:00 CDT, Height Measured, 146.8, lb, 12/30/20 12:15:00 CST, Weight Measured, (Ordered)         Culture, Urine, Routine* ([facility]), Specimen Type: Urine (Clean Catch), Collection Date: 12/30/20 12:34:00 CST         POC URINALYSIS, UA* ([facility]), Specimen Type: Urine, Collection Date: 12/30/20 12:34:00 CST  Patient Information     Name:MELY GARCIA      Address:      [address] [place] [phone]     Sex:Female     YOB: 1965     [phone]     Emergency Contact:ESTRELLA GARCIA" MRN:[id number]     FIN:[id number]     Location:[facility]     Date of Service:12/30/2020      Primary Care Physician:       Dom Novoa MD, [phone]      Attending Physician:       Alina Hunt PA-C, [phone]  Problem List/Past Medical History    Ongoing     Hashimoto's Thyroiditis     Hypothyroidism     Sjogren's disease    Historical     Pregnancy     Pregnancy     Pregnancy  Procedure/Surgical History     Colonoscopy (03/05/2019)            Comments: Indication:  Previous adenomatous polyp(s).      Sedation:  MAC.      Findings:  Long redundant colon.  External hemorrhoids.      Recommendation:  Repeat in 5 years..     Esophagogastroduodenoscopy (01/30/2018)            Comments: Indication:  History of gastric polyps with low-grade dysplasia..     Esophagogastroduodenoscopy (07/06/2016)            Comments: Dx:  Abdominal pain; dysphagia..     Colonoscopy (2015)           Cholecystectomy           Tonsillectomy            Comments: Childhood.  Medications    cevimeline 30 mg oral capsule, 30 mg= 1 cap(s), Oral, tid    hydroxychloroquine 200 mg oral tablet, 200 mg= 1 tab(s), Oral, daily    levothyroxine 112 mcg (0.112 mg) oral tablet, 112 mcg= 1 tab(s), Oral, daily, 3 refills    Macrobid 100 mg oral capsule, 100 mg= 1 cap(s), Oral, bid    Multiple Vitamins oral tablet, 1 tab(s), Oral, daily    omeprazole 20 mg oral delayed release capsule, 20 mg= 1 cap(s), Oral, daily, 3 refills    Tylenol 325 mg oral tablet, 650 mg= 2 tab(s), Oral, q4 hrs, PRN    Viactiv Soft Calcium Chews, 2 EA, daily  Allergies    codeine (Vomiting, Nausea)    Augmentin (rash)  Social History    Smoking Status     Never smoker     Alcohol      Current, Liquor (Hard) (1.5 oz), 1-2 times per month, 1 drinks/episode average. 2 drinks/episode maximum. Ready to change: No.     Electronic Cigarette/Vaping      Electronic Cigarette Use: Never.     Employment/School      Employed, Highest education level: BSN.      Exercise - Occasional exercise      Exercise type: Walking.     Home/Environment      Marital status:  (Living together). Spouse/Partner name: Eren. Lives with Children, Spouse. 3 children. Living situation: Home/Independent. Injuries/Abuse/Neglect in household: No. Feels unsafe at home: No. Family/Friends available for support: Yes.     Nutrition/Health      Type of diet: Regular. Wants to lose weight: No. Sleeping concerns: No. Feels highly stressed: No.     Other      First menses age 13. Regular menses. Menstrual duration 5-6 days, cycle interval 28 days. No history of abnormal Pap smear.     Sexual      Sexually active: Yes. Other contraceptive use: VAS.     Substance Abuse - Denies Substance Abuse      Never     Tobacco - Denies Tobacco Use      Never (less than 100 in lifetime)  Family History    Cancer: Mother (Dx at 71).    Father: History is negative    Sister: History is negative    Daughter: History is negative    Son: History is negative    Son: History is negative    Sister: History is negative  Immunizations      Vaccine Date Status          influenza virus vaccine, inactivated 10/07/2020 Recorded          influenza virus vaccine, inactivated 09/30/2019 Recorded          tetanus/diphth/pertuss (Tdap) adult/adol 04/26/2018 Given          influenza virus vaccine, inactivated 11/30/2017 Recorded          influenza virus vaccine, inactivated 10/01/2017 Recorded          influenza virus vaccine, inactivated 10/05/2016 Recorded          influenza virus vaccine, inactivated 10/16/2015 Recorded          influenza virus vaccine, inactivated 10/01/2015 Recorded              Comments : [2/1/2016] Given @ work          influenza virus vaccine, inactivated 10/07/2014 Recorded          influenza virus vaccine, inactivated 10/01/2014 Recorded              Comments : [2/10/2015] Given @ work.          influenza virus vaccine, inactivated 10/24/2013 Recorded          pneumococcal (PPSV23) 03/03/2009 Recorded           influenza virus vaccine, inactivated 10/28/2008 Recorded          tetanus/diphth/pertuss (Tdap) adult/adol 11/05/2007 Recorded          influenza virus vaccine, inactivated 11/05/2007 Recorded          influenza virus vaccine, inactivated 11/14/2006 Recorded          influenza virus vaccine, inactivated 11/08/2005 Recorded          Td 04/03/2003 Recorded          hepatitis B adult vaccine 11/01/1993 Recorded          hepatitis B adult vaccine 06/01/1993 Recorded          hepatitis B adult vaccine 05/01/1993 Recorded          MMR (measles/mumps/rubella) 11/01/1971 Recorded  Lab Results       Lab Results (Last 4 results within 90 days)        UA pH: 7 [5  - 8] (12/30/20 12:44:00)       UA Specific Gravity: 1.01 [1.001  - 1.035] (12/30/20 12:44:00)       UA Glucose: NEGATIVE [NEGATIVE  - NEGATIVE] (12/30/20 12:44:00)       UA Bilirubin: NEGATIVE [NEGATIVE  - NEGATIVE] (12/30/20 12:44:00)       UA Ketones: NEGATIVE [NEGATIVE  - NEGATIVE] (12/30/20 12:44:00)       Urine Occult Blood: TRACE Abnormal [NEGATIVE  - NEGATIVE] (12/30/20 12:44:00)       UA Protein: NEGATIVE [NEGATIVE  - NEGATIVE] (12/30/20 12:44:00)       UA Nitrite: NEGATIVE [NEGATIVE  - NEGATIVE] (12/30/20 12:44:00)       UA Leukocyte Esterase: 3+ Abnormal [NEGATIVE  - NEGATIVE] (12/30/20 12:44:00)       UA Epithelial Cells: Few [None  - Few] (12/30/20 13:00:00)       UA WBC: 11-25 [None  - 5] (12/30/20 13:00:00)       UA RBC: 0-2 [None  - 2] (12/30/20 13:00:00)       UA Bacteria: Few [None  - Few] (12/30/20 13:00:00)

## 2022-02-15 NOTE — NURSING NOTE
Comprehensive Intake Entered On:  6/5/2019 8:15 AM CDT    Performed On:  6/5/2019 8:08 AM CDT by Halima Tabor CMA               Summary   Chief Complaint :   Physcial and TSH med check; review labs; other concerns   Menstrual Status :   Menarcheal   Weight Measured :   143.6 lb(Converted to: 143 lb 10 oz, 65.14 kg)    Height Measured :   67 in(Converted to: 5 ft 7 in, 170.18 cm)    Body Mass Index :   22.49 kg/m2   Body Surface Area :   1.75 m2   Systolic Blood Pressure :   100 mmHg   Diastolic Blood Pressure :   62 mmHg   Mean Arterial Pressure :   75 mmHg   Peripheral Pulse Rate :   80 bpm   BP Method :   Manual   HR Method :   Manual   Halima Tabor CMA - 6/5/2019 8:08 AM CDT   Health Status   Allergies Verified? :   Yes   Medication History Verified? :   Yes   Immunizations Current :   Yes   Medical History Verified? :   Yes   Pre-Visit Planning Status :   Completed   Tobacco Use? :   Never smoker   Halima Tabor CMA - 6/5/2019 8:08 AM CDT   Consents   Consent for Immunization Exchange :   Consent Granted   Consent for Immunizations to Providers :   Consent Granted   Halima Tabor CMA - 6/5/2019 8:08 AM CDT   Meds / Allergies   (As Of: 6/5/2019 8:15:35 AM CDT)   Allergies (Active)   Augmentin  Estimated Onset Date:   Unspecified ; Reactions:   rash ; Created By:   Karine Pabon CMA; Reaction Status:   Active ; Category:   Drug ; Substance:   Augmentin ; Type:   Allergy ; Updated By:   Karine Pabon CMA; Reviewed Date:   6/5/2019 8:11 AM CDT      codeine  Estimated Onset Date:   Unspecified ; Reactions:   Nausea, Vomiting ; Created By:   Yumiko Suarez CMA; Reaction Status:   Active ; Category:   Drug ; Substance:   codeine ; Type:   Allergy ; Severity:   Severe ; Updated By:   Yumiko Suarez CMA; Reviewed Date:   6/5/2019 8:11 AM CDT        Medication List   (As Of: 6/5/2019 8:15:35 AM CDT)   Prescription/Discharge Order    chlorpheniramine-hydrocodone  :   chlorpheniramine-hydrocodone ; Status:   Prescribed ;  Ordered As Mnemonic:   chlorpheniramine-hydrocodone 8 mg-10 mg/5 mL oral suspension, extended release ; Simple Display Line:   5 mL, PO, q12 hrs, PRN: for cough, 120 mL, 0 Refill(s) ; Ordering Provider:   Dom Novoa MD; Catalog Code:   chlorpheniramine-hydrocodone ; Order Dt/Tm:   4/20/2017 1:33:08 PM          levalbuterol  :   levalbuterol ; Status:   Prescribed ; Ordered As Mnemonic:   Xopenex HFA 45 mcg/inh inhalation aerosol ; Simple Display Line:   2 puff(s), inh, q4 hrs, 1 EA, PRN: as needed for wheezing ; Ordering Provider:   Dom Novoa MD; Catalog Code:   levalbuterol ; Order Dt/Tm:   12/21/2012 1:18:49 PM          levothyroxine  :   levothyroxine ; Status:   Prescribed ; Ordered As Mnemonic:   levothyroxine 112 mcg (0.112 mg) oral tablet ; Simple Display Line:   112 mcg, 1 tab(s), PO, Daily, 90 tab(s), 3 Refill(s) ; Ordering Provider:   Dom Novoa MD; Catalog Code:   levothyroxine ; Order Dt/Tm:   4/26/2018 10:21:53 AM            Home Meds    acetaminophen  :   acetaminophen ; Status:   Documented ; Ordered As Mnemonic:   Tylenol 325 mg oral tablet ; Simple Display Line:   650 mg, 2 tab(s), PO, q4hr, PRN: for pain, 120 tab(s), 0 Refill(s) ; Catalog Code:   acetaminophen ; Order Dt/Tm:   3/6/2017 9:07:21 AM          cevimeline  :   cevimeline ; Status:   Documented ; Ordered As Mnemonic:   cevimeline 30 mg oral capsule ; Simple Display Line:   30 mg, 1 cap(s), PO, TID, 90 cap(s), 0 Refill(s) ; Catalog Code:   cevimeline ; Order Dt/Tm:   3/20/2018 3:01:27 PM          hydroxychloroquine  :   hydroxychloroquine ; Status:   Documented ; Ordered As Mnemonic:   hydroxychloroquine 200 mg oral tablet ; Simple Display Line:   200 mg, 1 tab(s), po, bid, 180 tab(s), 0 Refill(s) ; Catalog Code:   hydroxychloroquine ; Order Dt/Tm:   3/20/2018 3:00:18 PM          multivitamin  :   multivitamin ; Status:   Documented ; Ordered As Mnemonic:   Multiple Vitamins oral tablet ; Simple Display Line:   1 tab(s),  po, daily, tab(s) ; Catalog Code:   multivitamin ; Order Dt/Tm:   3/22/2011 9:01:05 AM          multivitamin with minerals  :   multivitamin with minerals ; Status:   Documented ; Ordered As Mnemonic:   Viactiv Soft Calcium Chews ; Simple Display Line:   2 EA, daily, chews, 0 Refill(s) ; Catalog Code:   multivitamin with minerals ; Order Dt/Tm:   3/6/2017 9:06:54 AM          omeprazole  :   omeprazole ; Status:   Documented ; Ordered As Mnemonic:   omeprazole 20 mg oral delayed release capsule ; Simple Display Line:   20 mg, 1 cap(s), PO, Daily, 90 cap(s), 0 Refill(s) ; Catalog Code:   omeprazole ; Order Dt/Tm:   4/26/2018 10:03:03 AM

## 2022-02-15 NOTE — NURSING NOTE
Depression Screening Entered On:  6/9/2021 8:57 AM CDT    Performed On:  6/9/2021 8:57 AM CDT by Franci Cotton CMA               Depression Screening   Little Interest - Pleasure in Activities :   Not at all   Feeling Down, Depressed, Hopeless :   Not at all   Initial Depression Screen Score :   0 Score   Poor Appetite or Overeating :   Not at all   Trouble Falling or Staying Asleep :   More than half the days   Feeling Tired or Little Energy :   Several days   Feeling Bad About Yourself :   Several days   Trouble Concentrating :   Not at all   Moving or Speaking Slowly :   Not at all   Thoughts Better Off Dead or Hurting Self :   Not at all   Difficulty at Work, Home, Getting Along :   Not difficult at all   Detailed Depression Screen Score :   4    Total Depression Screen Score :   4    Franci Cotton CMA - 6/9/2021 8:57 AM CDT

## 2022-02-22 LAB — DEPRECATED S PYO AG THROAT QL EIA: NEGATIVE

## 2022-03-02 NOTE — TELEPHONE ENCOUNTER
---------------------  From: Rosana Castellanos   To: Appointment Pool ([id number]); [facility] Message Pool ([id number]);     Sent: 2/8/2022 11:47:08 AM CST  Subject: General Message     appt please schedule poonam today for RAPID STREP ONLY driving white SUV becka [place]--please order rapid strep test only [place].PT IS ADDED TO POONAM

## 2022-03-02 NOTE — TELEPHONE ENCOUNTER
"---------------------  From: Rosana Castellanos   To: MELY GARCIA    Sent: 2/10/2022 11:11:09 AM CST  Subject: General Message       The culture of your throat is NEGATIVE for group A Streptococcus, the type of Streptococcus that causes \"Strep Throat\".  Please call or return to see me if you are not feeling better.  Thank you.    DEENA Trejo    Results:  Date Result Name Value   2/8/2022 3:38 PM Culture Strep A See comment  "

## 2022-03-02 NOTE — PROGRESS NOTES
Patient:   MELY GARCIA            MRN: [id number]            FIN: [id number]               Age:   56 years     Sex:  Female     :  1965   Associated Diagnoses:   Bronchitis; Cough   Author:   Rosana Castellanos      Visit Information      Date of Service: 2022 10:20 am  Performing Location: [facility]  Encounter#: [id number]      Primary Care Provider (PCP):  Dom Novoa MD    NPI# [id number]      Referring Provider:  Rosana Castellanos    NPI# [id number]   Visit type:  Telephone Encounter.    Source of history:  Patient.    Location of patient:  _home  Call Start Time:   _1120  Call End Time:    _1130      Chief Complaint   2022 10:47 AM CST    cold x2 weeks, home COVID test last week that was negative, lots of coughing, very congested in the sinuses, HA, fatigue, negative home COVID test last week - verbal consent for telephone visit     _      History of Present Illness   Today's visit was conducted via telephone due to the COVID-19 pandemic. Patient's consent to telephone visit was obtained and documented.      Reason for visit:  _she is covid vaccinated. Had COVID at [facility]. About 2 weeks ago she started head cold and just cant shake it. Fever at first but no more . The cough is the most distressing. No asthma hx, does not feel SOB or wheezing. Doesn't often need antibiotics. Had negative home covid test last week         Impression and Plan   Diagnosis     Bronchitis (UMD28-WJ J40).     Cough (MPW71-ZV R05.9).     Plan:  will treat for secondary bacterial infection, if not improving advised a visit to consider CXR.    Patient Instructions:       Counseled: Patient, Regarding treatment, Verbalized understanding.    Orders     Orders (Selected)   Prescriptions  Prescribed  Azithromycin 5 Day Dose Pack 250 mg oral tablet: 500mg day 1, 250 mg day 2-5, Oral, daily, # 6 tab(s), 0 Refill(s), Type: Acute, Pharmacy: [facility], 500mg day 1, 250 mg day  2-5 Oral daily, 67, in, 06/09/21 8:03:00 CDT, Height Measured, 143, lb, 06/09/21 8:03:00 CDT, Weight Measured  Tessalon Perles 100 mg oral capsule: = 1 cap(s) ( 100 mg ), Oral, tid, x 7 day(s), # 21 cap(s), 0 Refill(s), Type: Acute, Pharmacy: [facility], 1 cap(s) Oral tid,x7 day(s), 67, in, 06/09/21 8:03:00 CDT, Height Measured, 143, lb, 06/09/21 8:03:00 CDT, Weight Measured.        Health Status   Allergies:    Allergic Reactions (Selected)  Severe  Codeine (Nausea and vomiting)  Severity Not Documented  Augmentin (Rash)   Medications:  (Selected)   Prescriptions  Prescribed  levothyroxine 112 mcg (0.112 mg) oral tablet: = 1 tab(s) ( 112 mcg ), PO, Daily, # 90 tab(s), 3 Refill(s), Type: Maintenance, Pharmacy: [facility], 1 tab(s) Oral daily, 67, in, 06/09/21 8:03:00 CDT, Height Measured, 143, lb, 06/09/21 8:03:00 CDT, Weight Measured  omeprazole 20 mg oral delayed release capsule: = 1 cap(s) ( 20 mg ), PO, Daily, # 90 cap(s), 3 Refill(s), Type: Maintenance, Pharmacy: [facility], 1 cap(s) Oral daily, 67, in, 06/09/21 8:03:00 CDT, Height Measured, 143, lb, 06/09/21 8:03:00 CDT, Weight Measured  Documented Medications  Documented  Multiple Vitamins oral tablet: 1 tab(s), po, daily, tab(s), 0 Refill(s), Type: Maintenance  Tylenol 325 mg oral tablet: 2 tab(s) ( 650 mg ), PO, q4hr, PRN: for pain, # 120 tab(s), 0 Refill(s), Type: Maintenance  cevimeline 30 mg oral capsule: = 1 cap(s) ( 30 mg ), Oral, bid, Type: Maintenance  hydroxychloroquine 200 mg oral tablet: = 1 tab(s) ( 200 mg ), po, daily, # 180 tab(s), 0 Refill(s), Type: Maintenance,    Medications          *denotes recorded medication          *Tylenol 325 mg oral tablet: 650 mg, 2 tab(s), PO, q4hr, PRN: for pain, 120 tab(s), 0 Refill(s).          *cevimeline 30 mg oral capsule: 30 mg, 1 cap(s), Oral, bid.          *hydroxychloroquine 200 mg oral tablet: 200 mg, 1 tab(s), po, daily, 180 tab(s), 0 Refill(s).          levothyroxine 112 mcg (0.112 mg) oral  tablet: 112 mcg, 1 tab(s), PO, Daily, 90 tab(s), 3 Refill(s).          *Multiple Vitamins oral tablet: 1 tab(s), po, daily, tab(s).          omeprazole 20 mg oral delayed release capsule: 20 mg, 1 cap(s), PO, Daily, 90 cap(s), 3 Refill(s).       Problem list:    All Problems  Sjogren's disease / SNOMED CT 681276317 / Confirmed  Hashimoto's Thyroiditis / ICD-9-.2 / Confirmed  Gastritis / SNOMED CT 9974438 / Confirmed  Hypothyroidism / SNOMED CT 408490431 / Confirmed      Histories   Past Medical History:    Active  Hashimoto's Thyroiditis (245.2): Onset in  at 31 years.  Hypothyroidism (819499293)  Sjogren's disease (981705289)  Resolved  Pregnancy (033348551):  Resolved in  at 25 years.  Pregnancy (901721574):  Resolved in  at 26 years.  Pregnancy (659880666):  Resolved in  at 31 years.   Family History:    Cancer  Mother (): onset at 71 .  Comments:  3/6/2017 11:01 AM RICARDO Novoa MD, Dom  unknown primary cancer     Procedure history:    Colonoscopy (SNOMED CT 546016540) performed by Jayne Harvey MD on 3/5/2019 at 53 Years.  Comments:  3/14/2019 11:45 AM Manuela Gonzáles  Indication:  Previous adenomatous polyp(s).  Sedation:  MAC.  Findings:  Long redundant colon.  External hemorrhoids.  Recommendation:  Repeat in 5 years.  Esophagogastroduodenoscopy (SNOMED CT 974269050) performed by Jayne Gasca MD on 2018 at 52 Years.  Comments:  2/15/2018 9:07 AM Manuela Reyes  Indication:  History of gastric polyps with low-grade dysplasia.  Esophagogastroduodenoscopy (SNOMED CT 027969441) performed by Jayne Harvey MD on 2016 at 50 Years.  Comments:  2017 12:20 PM [facility] - Manuela Daley  Dx:  Abdominal pain; dysphagia.  Colonoscopy (SNOMED CT 605021535) in  at 50 Years.  Cholecystectomy (SNOMED CT 23548700).  Tonsillectomy (SNOMED CT 859013988).  Comments:  2014 10:00 AM CDT - Bailee Knox LPN  Childhood   Social History:        Electronic Cigarette/Vaping  Assessment: Denies Electronic Cigarette Use            Electronic Cigarette Use: Never.      Alcohol Assessment            Current, Liquor (Hard) (1.5 oz), 1-2 times per month, 1 drinks/episode average.  2 drinks/episode maximum.               Ready to change: No.                     Comments:                      03/06/2017 - Bailee Knox LPN                     Rare alcohol      Tobacco Assessment: Denies Tobacco Use            Never (less than 100 in lifetime)      Substance Abuse Assessment: Denies Substance Abuse            Never      Employment and Education Assessment            Employed, Highest education level: BSN.                     Comments:                      05/08/2014 - Bailee Knox LPN, RN [employer]      Home and Environment Assessment            Marital status:  (Living together).  Spouse/Partner name: Eern.  Lives with Children, Spouse.  3               children.  Living situation: Home/Independent.  Injuries/Abuse/Neglect in household: No.  Feels unsafe at               home: No.  Family/Friends available for support: Yes.      Nutrition and Health Assessment            Type of diet: Regular.  Wants to lose weight: No.  Sleeping concerns: No.  Feels highly stressed: No.      Exercise and Physical Activity Assessment: Occasional exercise            Exercise type: Walking.      Sexual Assessment            Sexually active: Yes.  Other contraceptive use: VAS.      Other Assessment            First menses age 13.  Regular menses.  Menstrual duration 5-6 days, cycle interval 28 days.  No history of               abnormal Pap smear.

## 2022-03-02 NOTE — TELEPHONE ENCOUNTER
---------------------  From: Rosana Castellanos   Sent: 2/8/2022 4:03:15 PM CST  Subject: General Message     called and informed rapid strep negative

## 2022-03-02 NOTE — NURSING NOTE
Comprehensive Intake Entered On:  2/8/2022 10:55 AM CST    Performed On:  2/8/2022 10:47 AM CST by Sloane Chase LPN               Summary   Chief Complaint :   cold x2 weeks, home COVID test last week that was negative, lots of coughing, very congested in the sinuses, HA, fatigue, negative home COVID test last week - verbal consent for telephone visit   Menstrual Status :   Menarcheal   Height/Length Estimated :   67 in(Converted to: 5 ft 7 in, 170.18 cm)    Sloane Chase LPN - 2/8/2022 10:47 AM CST   Health Status   Allergies Verified? :   Yes   Medication History Verified? :   Yes   Immunizations Current :   Yes   Medical History Verified? :   No   Pre-Visit Planning Status :   Not completed   Tobacco Use? :   Never smoker   Sloane Chase LPN - 2/8/2022 10:47 AM CST   Meds / Allergies   (As Of: 2/8/2022 10:55:14 AM CST)   Allergies (Active)   Augmentin  Estimated Onset Date:   Unspecified ; Reactions:   rash ; Created By:   Karine Pabon CMA; Reaction Status:   Active ; Category:   Drug ; Substance:   Augmentin ; Type:   Allergy ; Updated By:   Karine Pabon CMA; Reviewed Date:   6/9/2021 8:20 AM CDT      codeine  Estimated Onset Date:   Unspecified ; Reactions:   Nausea, Vomiting ; Created By:   Yumiko Suarez CMA; Reaction Status:   Active ; Category:   Drug ; Substance:   codeine ; Type:   Allergy ; Severity:   Severe ; Updated By:   Yumiko Suarez CMA; Reviewed Date:   6/9/2021 8:20 AM CDT        Medication List   (As Of: 2/8/2022 10:55:14 AM CST)   Prescription/Discharge Order    levothyroxine  :   levothyroxine ; Status:   Prescribed ; Ordered As Mnemonic:   levothyroxine 112 mcg (0.112 mg) oral tablet ; Simple Display Line:   112 mcg, 1 tab(s), PO, Daily, 90 tab(s), 3 Refill(s) ; Ordering Provider:   Dom Novoa MD; Catalog Code:   levothyroxine ; Order Dt/Tm:   6/9/2021 8:30:32 AM CDT          omeprazole  :   omeprazole ; Status:   Prescribed ; Ordered As Mnemonic:   omeprazole 20 mg  oral delayed release capsule ; Simple Display Line:   20 mg, 1 cap(s), PO, Daily, 90 cap(s), 3 Refill(s) ; Ordering Provider:   Dom Novoa MD; Catalog Code:   omeprazole ; Order Dt/Tm:   6/9/2021 8:31:37 AM CDT            Home Meds    acetaminophen  :   acetaminophen ; Status:   Documented ; Ordered As Mnemonic:   Tylenol 325 mg oral tablet ; Simple Display Line:   650 mg, 2 tab(s), PO, q4hr, PRN: for pain, 120 tab(s), 0 Refill(s) ; Catalog Code:   acetaminophen ; Order Dt/Tm:   3/6/2017 9:07:21 AM CST          multivitamin  :   multivitamin ; Status:   Documented ; Ordered As Mnemonic:   Multiple Vitamins oral tablet ; Simple Display Line:   1 tab(s), po, daily, tab(s) ; Catalog Code:   multivitamin ; Order Dt/Tm:   3/22/2011 9:01:05 AM CDT          hydroxychloroquine  :   hydroxychloroquine ; Status:   Documented ; Ordered As Mnemonic:   hydroxychloroquine 200 mg oral tablet ; Simple Display Line:   200 mg, 1 tab(s), po, daily, 180 tab(s), 0 Refill(s) ; Catalog Code:   hydroxychloroquine ; Order Dt/Tm:   3/20/2018 3:00:18 PM CDT          cevimeline  :   cevimeline ; Status:   Documented ; Ordered As Mnemonic:   cevimeline 30 mg oral capsule ; Simple Display Line:   30 mg, 1 cap(s), Oral, bid ; Catalog Code:   cevimeline ; Order Dt/Tm:   2/8/2022 10:53:53 AM CST            Social History   Social History   (As Of: 2/8/2022 10:55:14 AM CST)   Alcohol:        Current, Liquor (Hard) (1.5 oz), 1-2 times per month, 1 drinks/episode average.  2 drinks/episode maximum.  Ready to change: No.   Comments:  3/6/2017 9:07 AM - Bailee Knox LPN: Rare alcohol   (Last Updated: 6/6/2019 10:12:56 AM CDT by Manuela Daley)          Tobacco:  Denies Tobacco Use      Never (less than 100 in lifetime)   (Last Updated: 2/8/2022 10:52:21 AM CST by Sloane Chase LPN)          Electronic Cigarette/Vaping:  Denies Electronic Cigarette Use      Electronic Cigarette Use: Never.   (Last Updated: 2/8/2022 10:52:24 AM CST by  Corina Chase LPN          Substance Abuse:  Denies Substance Abuse      Never   (Last Updated: 5/31/2018 9:27:13 AM CDT by Manuela Daley)          Employment/School:        Employed, Highest education level: BSN.   Comments:  5/8/2014 10:01 AM - Bailee Knox LPN: [facility]   (Last Updated: 6/6/2019 10:14:12 AM CDT by Manuela Daley)          Home/Environment:        Marital status:  (Living together).  Spouse/Partner name: Eren.  Lives with Children, Spouse.  3 children.  Living situation: Home/Independent.  Injuries/Abuse/Neglect in household: No.  Feels unsafe at home: No.  Family/Friends available for support: Yes.   (Last Updated: 6/6/2019 10:13:51 AM CDT by Manuela Daley)          Nutrition/Health:        Type of diet: Regular.  Wants to lose weight: No.  Sleeping concerns: No.  Feels highly stressed: No.   (Last Updated: 6/6/2019 10:13:21 AM CDT by Manuela Daley)          Exercise:  Occasional exercise      Exercise type: Walking.   (Last Updated: 5/8/2014 10:02:43 AM CDT by Bailee Knox LPN)          Sexual:        Sexually active: Yes.  Other contraceptive use: VAS.   (Last Updated: 3/22/2011 9:52:57 AM CDT by Yumiko Suarez CMA)          Other:        First menses age 13.  Regular menses.  Menstrual duration 5-6 days, cycle interval 28 days.  No history of abnormal Pap smear.   (Last Updated: 10/27/2014 2:21:14 PM CDT by Manuela Daley)

## 2022-03-02 NOTE — NURSING NOTE
Rapid Strep POC Entered On:  2/22/2022 10:55 AM CST    Performed On:  2/8/2022 10:55 AM CST by Manuela Daley               Rapid Strep POC   Rapid Strep POC :   Negative   POC Test Comments :   [facility]   Manuela Daley - 2/22/2022 10:55 AM CST

## 2022-03-09 ENCOUNTER — OFFICE VISIT (OUTPATIENT)
Dept: FAMILY MEDICINE | Facility: CLINIC | Age: 57
End: 2022-03-09
Payer: COMMERCIAL

## 2022-03-09 VITALS
SYSTOLIC BLOOD PRESSURE: 130 MMHG | OXYGEN SATURATION: 97 % | HEART RATE: 95 BPM | DIASTOLIC BLOOD PRESSURE: 82 MMHG | TEMPERATURE: 99.6 F

## 2022-03-09 DIAGNOSIS — J01.90 ACUTE SINUSITIS TREATED WITH ANTIBIOTICS IN THE PAST 60 DAYS: Primary | ICD-10-CM

## 2022-03-09 PROBLEM — M35.00 SJOGREN'S SYNDROME (H): Status: ACTIVE | Noted: 2022-03-09

## 2022-03-09 PROBLEM — K29.70 GASTRITIS: Status: ACTIVE | Noted: 2022-03-09

## 2022-03-09 PROBLEM — E06.3 HASHIMOTO'S THYROIDITIS: Status: ACTIVE | Noted: 2022-03-09

## 2022-03-09 PROCEDURE — 99213 OFFICE O/P EST LOW 20 MIN: CPT | Performed by: NURSE PRACTITIONER

## 2022-03-09 RX ORDER — CEFUROXIME AXETIL 500 MG/1
500 TABLET ORAL 2 TIMES DAILY
Qty: 20 TABLET | Refills: 0 | Status: SHIPPED | OUTPATIENT
Start: 2022-03-09 | End: 2022-03-19

## 2022-03-09 NOTE — PROGRESS NOTES
Assessment & Plan     Acute sinusitis treated with antibiotics in the past 60 days  Failed with z pack  - cefuroxime (CEFTIN) 500 MG tablet; Take 1 tablet (500 mg) by mouth 2 times daily for 10 days    Also advised saline nasal spray             No follow-ups on file.    Rosana Julian NP  [facility]    Cruzito Cardoza is a 56 year old who presents for the following health issues: continued sinus issues, since January, has been treated with antibiotics, nasal congestion/pressure, eyes hurt, frequent nose blowing     Sinus Problem     History of Present Illness       Reason for visit:  I think I have a sinus infection  Symptom onset:  3-4 weeks ago  Symptoms include:  Constant pressure/tightness in upper face/eyes/ constant stuffy nose/ blowing my nose  Symptom intensity:  Moderate  Symptom progression:  Staying the same  Had these symptoms before:  Yes  Has tried/received treatment for these symptoms:  Yes  What makes it worse:  Leaning over- 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  She exercises with enough effort to increase her heart rate 3 or less days per week.   She is taking medications regularly.       Review of Systems         Objective    /82 (BP Location: Right arm, Patient Position: Sitting)   Pulse 95   Temp 99.6  F (37.6  C)   SpO2 97%   There is no height or weight on file to calculate BMI.  Physical Exam  Constitutional:       Appearance: Normal appearance.   HENT:      Head: Normocephalic.      Right Ear: Tympanic membrane normal.      Left Ear: Tympanic membrane normal.   Eyes:      Extraocular Movements: Extraocular movements intact.      Conjunctiva/sclera: Conjunctivae normal.       Pupils: Pupils are equal, round, and reactive to light.   Cardiovascular:      Rate and Rhythm: Normal rate and regular rhythm.      Heart sounds: Normal heart sounds.   Pulmonary:      Effort: Pulmonary effort is normal.      Breath sounds: Normal breath sounds.   Musculoskeletal:         General: Normal range of motion.      Cervical back: Normal range of motion and neck supple.   Skin:     General: Skin is warm and dry.   Neurological:      General: No focal deficit present.      Mental Status: She is alert and oriented to person, place, and time.   Psychiatric:         Mood and Affect: Mood normal.        Some tenderenss over both max sinuses with palpation

## 2022-04-03 ENCOUNTER — HEALTH MAINTENANCE LETTER (OUTPATIENT)
Age: 57
End: 2022-04-03

## 2022-05-02 DIAGNOSIS — E06.3 HASHIMOTO'S THYROIDITIS: Primary | ICD-10-CM

## 2022-05-04 RX ORDER — LEVOTHYROXINE SODIUM 112 UG/1
TABLET ORAL
Qty: 30 TABLET | Refills: 0 | Status: SHIPPED | OUTPATIENT
Start: 2022-05-04 | End: 2022-06-13

## 2022-05-04 NOTE — TELEPHONE ENCOUNTER
Last Written Prescription Date:  6/9/21  Last Fill Quantity: 90,  # refills: 0   Last office visit: 3/9/2022 with prescribing provider:  Sinusitis   Future Office Visit:   Next 5 appointments (look out 90 days)    Jun 13, 2022  8:20 AM  (Arrive by 8:00 AM)  Adult Preventative Visit with Dom Novoa MD  [facility] ([facility] ) [address] [id number] [phone]         Last TSH: 1.74 on 12/10/21    [facility] 30 days    Josephine Navarro RN

## 2022-05-26 ENCOUNTER — TELEPHONE (OUTPATIENT)
Dept: FAMILY MEDICINE | Facility: CLINIC | Age: 57
End: 2022-05-26
Payer: COMMERCIAL

## 2022-05-26 DIAGNOSIS — Z13.220 SCREENING FOR CHOLESTEROL LEVEL: ICD-10-CM

## 2022-05-26 DIAGNOSIS — E06.3 HASHIMOTO'S THYROIDITIS: ICD-10-CM

## 2022-05-26 DIAGNOSIS — Z13.1 SCREENING FOR DIABETES MELLITUS: Primary | ICD-10-CM

## 2022-05-26 RX ORDER — LEVOTHYROXINE SODIUM 112 UG/1
112 TABLET ORAL DAILY
Qty: 30 TABLET | Refills: 0 | Status: SHIPPED | OUTPATIENT
Start: 2022-05-26 | End: 2022-06-13

## 2022-05-26 NOTE — TELEPHONE ENCOUNTER
Reason for Call:  Medication or medication refill:    Do you use a [facility]?  Name of the pharmacy and phone number for the current request:  [facility]    Name of the medication requested: Levothyroxine 112 MCG tablet    Other request: pt will be out on 6.3..2022 and has an appt on 6.13.2022.  Pt usually thyroid labs, if so please enter order for pt to schedule labs week before appt.    Can we leave a detailed message on this number? YES    Phone number patient can be reached at: Home number on file [phone] (home)    Best Time: anytime    Call taken on 5/26/2022 at 8:34 AM by Yaquelin Shepherd

## 2022-05-26 NOTE — TELEPHONE ENCOUNTER
Tc with pt, informed refill has been sent and labs have been ordered.     Last Written Prescription Date: 5/4/22  Last Fill Quantity: 30,  # refills: 0   Last office visit: 3/9/2022 with prescribing provider  12/10/21 TSH completed    Future Office Visit:   Next 5 appointments (look out 90 days)    Jun 13, 2022  8:20 AM  (Arrive by 8:00 AM)  Adult Preventative Visit with Dom Novoa MD  [facility] ([facility] ) [address] [place] [id number] [phone]

## 2022-06-08 ENCOUNTER — LAB (OUTPATIENT)
Dept: LAB | Facility: CLINIC | Age: 57
End: 2022-06-08
Payer: COMMERCIAL

## 2022-06-08 DIAGNOSIS — Z13.220 SCREENING FOR CHOLESTEROL LEVEL: ICD-10-CM

## 2022-06-08 DIAGNOSIS — E06.3 HASHIMOTO'S THYROIDITIS: ICD-10-CM

## 2022-06-08 DIAGNOSIS — Z13.1 SCREENING FOR DIABETES MELLITUS: ICD-10-CM

## 2022-06-08 LAB
CHOLEST SERPL-MCNC: 146 MG/DL
FASTING STATUS PATIENT QL REPORTED: YES
FASTING STATUS PATIENT QL REPORTED: YES
GLUCOSE BLD-MCNC: 97 MG/DL (ref 70–99)
HDLC SERPL-MCNC: 55 MG/DL
LDLC SERPL CALC-MCNC: 79 MG/DL
NONHDLC SERPL-MCNC: 91 MG/DL
TRIGL SERPL-MCNC: 60 MG/DL
TSH SERPL DL<=0.005 MIU/L-ACNC: 0.78 MU/L (ref 0.4–4)

## 2022-06-08 PROCEDURE — 36415 COLL VENOUS BLD VENIPUNCTURE: CPT

## 2022-06-08 PROCEDURE — 84443 ASSAY THYROID STIM HORMONE: CPT

## 2022-06-08 PROCEDURE — 82947 ASSAY GLUCOSE BLOOD QUANT: CPT | Mod: QW

## 2022-06-08 PROCEDURE — 80061 LIPID PANEL: CPT

## 2022-06-12 ASSESSMENT — ENCOUNTER SYMPTOMS
HEADACHES: 0
COUGH: 0
ABDOMINAL PAIN: 0
HEARTBURN: 0
DIARRHEA: 0
DIZZINESS: 0
ARTHRALGIAS: 0
EYE PAIN: 0
CHILLS: 0
FEVER: 0
FREQUENCY: 0
MYALGIAS: 0
HEMATOCHEZIA: 0
SORE THROAT: 0
SHORTNESS OF BREATH: 0
WEAKNESS: 0
NAUSEA: 0
PARESTHESIAS: 0
PALPITATIONS: 0
HEMATURIA: 0
JOINT SWELLING: 0
DYSURIA: 0
CONSTIPATION: 0
BREAST MASS: 0
NERVOUS/ANXIOUS: 1

## 2022-06-13 ENCOUNTER — OFFICE VISIT (OUTPATIENT)
Dept: FAMILY MEDICINE | Facility: CLINIC | Age: 57
End: 2022-06-13
Payer: COMMERCIAL

## 2022-06-13 VITALS
HEART RATE: 74 BPM | OXYGEN SATURATION: 97 % | BODY MASS INDEX: 22.89 KG/M2 | WEIGHT: 146.16 LBS | SYSTOLIC BLOOD PRESSURE: 104 MMHG | DIASTOLIC BLOOD PRESSURE: 68 MMHG

## 2022-06-13 DIAGNOSIS — K29.50 OTHER CHRONIC GASTRITIS WITHOUT HEMORRHAGE: ICD-10-CM

## 2022-06-13 DIAGNOSIS — E06.3 HASHIMOTO'S THYROIDITIS: ICD-10-CM

## 2022-06-13 DIAGNOSIS — Z12.4 CERVICAL CANCER SCREENING: ICD-10-CM

## 2022-06-13 DIAGNOSIS — Z78.0 POSTMENOPAUSAL STATUS: ICD-10-CM

## 2022-06-13 DIAGNOSIS — Z00.00 WELL ADULT EXAM: Primary | ICD-10-CM

## 2022-06-13 PROCEDURE — G0145 SCR C/V CYTO,THINLAYER,RESCR: HCPCS | Performed by: FAMILY MEDICINE

## 2022-06-13 PROCEDURE — 99396 PREV VISIT EST AGE 40-64: CPT | Performed by: FAMILY MEDICINE

## 2022-06-13 PROCEDURE — 99214 OFFICE O/P EST MOD 30 MIN: CPT | Mod: 25 | Performed by: FAMILY MEDICINE

## 2022-06-13 PROCEDURE — 87624 HPV HI-RISK TYP POOLED RSLT: CPT | Performed by: FAMILY MEDICINE

## 2022-06-13 RX ORDER — LEVOTHYROXINE SODIUM 112 UG/1
112 TABLET ORAL DAILY
Qty: 90 TABLET | Refills: 3 | Status: SHIPPED | OUTPATIENT
Start: 2022-06-13 | End: 2023-05-10

## 2022-06-13 ASSESSMENT — ENCOUNTER SYMPTOMS
MUSCULOSKELETAL NEGATIVE: 1
ENDOCRINE NEGATIVE: 1
CONSTITUTIONAL NEGATIVE: 1
HEARTBURN: 1
RESPIRATORY NEGATIVE: 1
NEUROLOGICAL NEGATIVE: 1
CARDIOVASCULAR NEGATIVE: 1

## 2022-06-13 NOTE — PROGRESS NOTES
SUBJECTIVE:   CC: Nani Gibson is an 56 year old woman who presents for preventive health visit.     Patient has been advised of split billing requirements and indicates understanding: Yes  Patient is here for her annual exam and thyroid follow-up.  She has generally been in good health.  She had COVID around Spring followed by some persistent sinusitis but does finally feel like she is back to her usual health.  Thyroid medication is working well.  Due for refills.  TSH is reviewed.  Also had glucose and lipid done which were excellent.  Fasting lab screening should be repeated in 5 years.  Colonoscopy up to date.  Just had EGD which showed persistent gastritis.  She notes that she is sensitive to specific food triggers.  Not sure if the omeprazole is helping.  We will continue omeprazole for the time being.  Continuing EGD every 3 years.  Will be welcoming a new grandbaby in July.  We will plan to get her next COVID booster prior to the birth of the new grandchild.  Last Pap smear was in 2017.  Patient is now postmenopausal.  Last period was in approximately 2019.    Healthy Habits:     Getting at least 3 servings of Calcium per day:  Yes    Bi-annual eye exam:  Yes    Dental care twice a year:  Yes    Sleep apnea or symptoms of sleep apnea:  None    Diet:  Regular (no restrictions)    Frequency of exercise:  4-5 days/week    Duration of exercise:  15-30 minutes    Taking medications regularly:  Yes    Barriers to taking medications:  None    Medication side effects:  None    PHQ-2 Total Score: 0    Additional concerns today:  No      Today's PHQ-2 Score:   PHQ-2 ( 1999 Pfizer) 6/12/2022   Q1: Little interest or pleasure in doing things 0   Q2: Feeling down, depressed or hopeless 0   PHQ-2 Score 0   Q1: Little interest or pleasure in doing things Not at all   Q2: Feeling down, depressed or hopeless Not at all   PHQ-2 Score 0       Abuse: Current or Past (Physical, Sexual or Emotional) - No  Do you feel  safe in your environment? Yes    Have you ever done Advance Care Planning? (For example, a Health Directive, POLST, or a discussion with a medical provider or your loved ones about your wishes): Yes, patient states has an Advance Care Planning document and will bring a copy to the clinic.    Social History     Tobacco Use     Smoking status: Never Smoker     Smokeless tobacco: Never Used   Substance Use Topics     Alcohol use: Yes     Comment: rare     If you drink alcohol do you typically have >3 drinks per day or >7 drinks per week? No    Alcohol Use 6/13/2022   Prescreen: >3 drinks/day or >7 drinks/week? -   Prescreen: >3 drinks/day or >7 drinks/week? No       Reviewed orders with patient.  Reviewed health maintenance and updated orders accordingly - Yes      Breast Cancer Screening:    Breast CA Risk Assessment (FHS-7) 6/12/2022   Do you have a family history of breast, colon, or ovarian cancer? No / Unknown         Mammogram Screening: Recommended mammography every 1-2 years with patient discussion and risk factor consideration  Pertinent mammograms are reviewed under the imaging tab.    History of abnormal Pap smear: NO - age 30-65 PAP every 5 years with negative HPV co-testing recommended     Reviewed and updated as needed this visit by clinical staff   Tobacco  Allergies  Meds  Problems  Med Hx  Surg Hx  Fam Hx            Reviewed and updated as needed this visit by Provider   Tobacco  Allergies  Meds  Problems  Med Hx  Surg Hx  Fam Hx               Review of Systems   Constitutional: Negative.    HENT: Negative.    Respiratory: Negative.    Cardiovascular: Negative.    Gastrointestinal: Positive for heartburn.   Endocrine: Negative.    Genitourinary: Negative.    Musculoskeletal: Negative.    Neurological: Negative.           OBJECTIVE:   /68   Pulse 74   Wt 66.3 kg (146 lb 2.6 oz)   SpO2 97%   BMI 22.89 kg/m    Physical Exam  GENERAL APPEARANCE: healthy, alert and no distress  EYES:  Eyes grossly normal to inspection, PERRL and conjunctivae and sclerae normal  HENT: ear canals and TM's normal, nose and mouth without ulcers or lesions, oropharynx clear and oral mucous membranes moist  NECK: no adenopathy, no asymmetry, masses, or scars and thyroid normal to palpation  RESP: lungs clear to auscultation - no rales, rhonchi or wheezes  BREAST: normal without masses, tenderness or nipple discharge and no palpable axillary masses or adenopathy  CV: regular rate and rhythm, normal S1 S2, no S3 or S4, no murmur, click or rub, no peripheral edema and peripheral pulses strong  ABDOMEN: soft, nontender, no hepatosplenomegaly, no masses and bowel sounds normal   (female): normal female external genitalia, normal urethral meatus, vaginal mucosal atrophy noted, normal cervix, adnexae, and uterus without masses or abnormal discharge  MS: no musculoskeletal defects are noted and gait is age appropriate without ataxia  SKIN: no suspicious lesions or rashes, patient does have a 6 cm slightly irregular area of erythema just superior to her left scapula on her back.  Notes that this area has been itchy.  It is not raised or significantly warm.  There is no central clearing.  Patient will continue to monitor  NEURO: Normal strength and tone, sensory exam grossly normal, mentation intact and speech normal  PSYCH: mentation appears normal and affect normal/bright    Diagnostic Test Results:  Labs reviewed in Epic    ASSESSMENT/PLAN:   (Z00.00) Well adult exam  (primary encounter diagnosis)  Comment: Doing well  Plan: Preventive services as noted and health maintenance.  Follow-up annually    (Z12.4) Cervical cancer screening  Comment: Pap smear completed today  Plan: Pap Screen with HPV - recommended age 30 - 65         years            (E06.3) Hashimoto's thyroiditis  Comment: Patient with TSH in normal range earlier this month  Plan: levothyroxine (SYNTHROID/LEVOTHROID) 112 MCG         tablet        Continue  "levothyroxine.  TSH and follow-up in 1 year    (K29.50) Other chronic gastritis without hemorrhage  Comment: Following with GI  Plan: omeprazole (PRILOSEC) 20 MG DR capsule        Will continue on omeprazole at this time and follow-up annually    (Z78.0) Postmenopausal status  Comment: Has not previously had DEXA  Plan: DX Hip/Pelvis/Spine        Will do bone density testing with upcoming mammogram      Patient has been advised of split billing requirements and indicates understanding: Yes    COUNSELING:  Reviewed preventive health counseling, as reflected in patient instructions       Regular exercise       Healthy diet/nutrition       Osteoporosis prevention/bone health       Colorectal Cancer Screening       Advance Care Planning    Estimated body mass index is 22.89 kg/m  as calculated from the following:    Height as of 6/9/21: 1.702 m (5' 7\").    Weight as of this encounter: 66.3 kg (146 lb 2.6 oz).        She reports that she has never smoked. She has never used smokeless tobacco.      Counseling Resources:  ATP IV Guidelines  Pooled Cohorts Equation Calculator  Breast Cancer Risk Calculator  BRCA-Related Cancer Risk Assessment: FHS-7 Tool  FRAX Risk Assessment  ICSI Preventive Guidelines  Dietary Guidelines for Americans, 2010  USDA's MyPlate  ASA Prophylaxis  Lung CA Screening    Dom Novoa MD  [facility]  "

## 2022-06-15 LAB
BKR LAB AP GYN ADEQUACY: NORMAL
BKR LAB AP GYN INTERPRETATION: NORMAL
BKR LAB AP HPV REFLEX: NORMAL
BKR LAB AP PREVIOUS ABNORMAL: NORMAL
PATH REPORT.COMMENTS IMP SPEC: NORMAL
PATH REPORT.COMMENTS IMP SPEC: NORMAL
PATH REPORT.RELEVANT HX SPEC: NORMAL

## 2022-06-16 LAB
HUMAN PAPILLOMA VIRUS 16 DNA: NEGATIVE
HUMAN PAPILLOMA VIRUS 18 DNA: NEGATIVE
HUMAN PAPILLOMA VIRUS FINAL DIAGNOSIS: NORMAL
HUMAN PAPILLOMA VIRUS OTHER HR: NEGATIVE

## 2022-06-17 ENCOUNTER — ALLIED HEALTH/NURSE VISIT (OUTPATIENT)
Dept: FAMILY MEDICINE | Facility: CLINIC | Age: 57
End: 2022-06-17
Payer: COMMERCIAL

## 2022-06-17 DIAGNOSIS — Z23 NEED FOR VACCINATION: Primary | ICD-10-CM

## 2022-06-17 PROCEDURE — 91306 COVID-19,PF,MODERNA (18+ YRS BOOSTER .25ML): CPT

## 2022-06-17 PROCEDURE — 99207 PR NO CHARGE NURSE ONLY: CPT

## 2022-06-17 PROCEDURE — 0064A COVID-19,PF,MODERNA (18+ YRS BOOSTER .25ML): CPT

## 2022-08-26 ENCOUNTER — TRANSFERRED RECORDS (OUTPATIENT)
Dept: HEALTH INFORMATION MANAGEMENT | Facility: CLINIC | Age: 57
End: 2022-08-26

## 2022-10-03 ENCOUNTER — HEALTH MAINTENANCE LETTER (OUTPATIENT)
Age: 57
End: 2022-10-03

## 2022-10-30 ENCOUNTER — OFFICE VISIT (OUTPATIENT)
Dept: URGENT CARE | Facility: URGENT CARE | Age: 57
End: 2022-10-30
Payer: COMMERCIAL

## 2022-10-30 VITALS
BODY MASS INDEX: 23.27 KG/M2 | RESPIRATION RATE: 20 BRPM | DIASTOLIC BLOOD PRESSURE: 88 MMHG | WEIGHT: 148.6 LBS | TEMPERATURE: 98.2 F | HEART RATE: 89 BPM | OXYGEN SATURATION: 99 % | SYSTOLIC BLOOD PRESSURE: 122 MMHG

## 2022-10-30 DIAGNOSIS — R05.1 ACUTE COUGH: Primary | ICD-10-CM

## 2022-10-30 DIAGNOSIS — J98.01 BRONCHOSPASM: ICD-10-CM

## 2022-10-30 PROCEDURE — 99213 OFFICE O/P EST LOW 20 MIN: CPT | Performed by: PHYSICIAN ASSISTANT

## 2022-10-30 RX ORDER — PREDNISONE 20 MG/1
40 TABLET ORAL DAILY
Qty: 10 TABLET | Refills: 0 | Status: SHIPPED | OUTPATIENT
Start: 2022-10-30 | End: 2022-11-04

## 2022-10-30 RX ORDER — LEVALBUTEROL TARTRATE 45 UG/1
2 AEROSOL, METERED ORAL EVERY 4 HOURS PRN
Qty: 15 G | Refills: 0 | Status: SHIPPED | OUTPATIENT
Start: 2022-10-30 | End: 2022-11-14

## 2022-10-30 RX ORDER — BENZONATATE 200 MG/1
200 CAPSULE ORAL 3 TIMES DAILY PRN
Qty: 30 CAPSULE | Refills: 1 | Status: SHIPPED | OUTPATIENT
Start: 2022-10-30 | End: 2022-11-14

## 2022-10-30 NOTE — PROGRESS NOTES
Assessment & Plan     Acute cough      Bronchospasm    - predniSONE (DELTASONE) 20 MG tablet  Dispense: 10 tablet; Refill: 0  - levalbuterol (XOPENEX HFA) 45 MCG/ACT inhaler  Dispense: 15 g; Refill: 0  - benzonatate (TESSALON) 200 MG capsule  Dispense: 30 capsule; Refill: 1     Suspect this is related to raking leaves and either allergy or irritant mediated.    She otherwise feels quite well and has had RAD in the past several years ago.  Will try albuberol and prednisone.  Cough suppressant provided to use as needed as well.  Follow-up if not resolving as expected over the next week.        Alina Hunt PA-C  [facility]    Cruzito Cardoza is a 57 year old female who presents to clinic today for the following health issues:  Chief Complaint   Patient presents with     Illness     X 1 week dry cough, feels tight, robitussin and OTC medications do not help, no fever, no runny nose, started after raking leaves 3 days in a row     HPI   started after raking.    Otherwise well, no fevers, good energy.    No nausea, vomiting.    No chest pain.   No SOB, but just tight.    Has tested for covid 19 several times.          Review of Systems  Constitutional, HEENT, cardiovascular, pulmonary, gi and gu systems are negative, except as otherwise noted.      Objective    /88   Pulse 89   Temp 98.2  F (36.8  C) (Tympanic)   Resp 20   Wt 67.4 kg (148 lb 9.6 oz)   SpO2 99%   BMI 23.27 kg/m    Physical Exam   Pt is in no acute distress and appears well  Ears patent B:  TM s intact, non-injected. All land marks easily visibile    Nasal mucosa is non-edematous, no discharge.    Pharynx: non erythematous, tonsils non hypertrophied, No exudate   Neck supple: no adenopathy  Lungs: CTA  Heart: RRR, no murmur, no thrills or heaves   Ext: no edema  Skin: no rashes

## 2022-11-14 ENCOUNTER — OFFICE VISIT (OUTPATIENT)
Dept: FAMILY MEDICINE | Facility: CLINIC | Age: 57
End: 2022-11-14
Payer: COMMERCIAL

## 2022-11-14 ENCOUNTER — TELEPHONE (OUTPATIENT)
Dept: FAMILY MEDICINE | Facility: CLINIC | Age: 57
End: 2022-11-14

## 2022-11-14 VITALS
SYSTOLIC BLOOD PRESSURE: 135 MMHG | RESPIRATION RATE: 10 BRPM | BODY MASS INDEX: 23.57 KG/M2 | HEIGHT: 67 IN | TEMPERATURE: 98.7 F | HEART RATE: 73 BPM | OXYGEN SATURATION: 100 % | WEIGHT: 150.2 LBS | DIASTOLIC BLOOD PRESSURE: 81 MMHG

## 2022-11-14 DIAGNOSIS — N30.00 ACUTE CYSTITIS WITHOUT HEMATURIA: Primary | ICD-10-CM

## 2022-11-14 DIAGNOSIS — R30.9 PAINFUL URINATION: ICD-10-CM

## 2022-11-14 LAB
ALBUMIN UR-MCNC: NEGATIVE MG/DL
APPEARANCE UR: CLEAR
BACTERIA #/AREA URNS HPF: ABNORMAL /HPF
BILIRUB UR QL STRIP: NEGATIVE
COLOR UR AUTO: ABNORMAL
GLUCOSE UR STRIP-MCNC: NEGATIVE MG/DL
HGB UR QL STRIP: NEGATIVE
KETONES UR STRIP-MCNC: NEGATIVE MG/DL
LEUKOCYTE ESTERASE UR QL STRIP: ABNORMAL
NITRATE UR QL: NEGATIVE
PH UR STRIP: 7 [PH] (ref 5–7)
RBC #/AREA URNS AUTO: ABNORMAL /HPF
SP GR UR STRIP: 1.01 (ref 1–1.03)
SQUAMOUS #/AREA URNS AUTO: ABNORMAL /LPF
UROBILINOGEN UR STRIP-ACNC: 0.2 E.U./DL
WBC #/AREA URNS AUTO: ABNORMAL /HPF

## 2022-11-14 PROCEDURE — 99214 OFFICE O/P EST MOD 30 MIN: CPT | Performed by: FAMILY MEDICINE

## 2022-11-14 PROCEDURE — 81001 URINALYSIS AUTO W/SCOPE: CPT | Performed by: FAMILY MEDICINE

## 2022-11-14 PROCEDURE — 87086 URINE CULTURE/COLONY COUNT: CPT | Performed by: FAMILY MEDICINE

## 2022-11-14 RX ORDER — NITROFURANTOIN 25; 75 MG/1; MG/1
100 CAPSULE ORAL 2 TIMES DAILY
Qty: 14 CAPSULE | Refills: 0 | Status: SHIPPED | OUTPATIENT
Start: 2022-11-14 | End: 2022-11-21

## 2022-11-14 ASSESSMENT — ENCOUNTER SYMPTOMS
EYES NEGATIVE: 1
MUSCULOSKELETAL NEGATIVE: 1
GASTROINTESTINAL NEGATIVE: 1
PSYCHIATRIC NEGATIVE: 1
NEUROLOGICAL NEGATIVE: 1
CONSTITUTIONAL NEGATIVE: 1
ENDOCRINE NEGATIVE: 1
CARDIOVASCULAR NEGATIVE: 1
RESPIRATORY NEGATIVE: 1
HEMATOLOGIC/LYMPHATIC NEGATIVE: 1
ALLERGIC/IMMUNOLOGIC NEGATIVE: 1

## 2022-11-14 NOTE — TELEPHONE ENCOUNTER
Reason for Call:  Other appointment    Detailed comments: patient calling and she thinks she might have a UTI. There is an approval requested spot at 2:20pm with Dr. Lanier or 3pm with Dr. Rowland and is wondering if she can be added to the schedule?     Phone Number Patient can be reached at: Home number on file [phone] (home)    Best Time: Any    Can we leave a detailed message on this number? YES    Call taken on 11/14/2022 at 11:13 AM by Portia Dodd CMA

## 2022-11-14 NOTE — PROGRESS NOTES
Assessment & Plan     Acute cystitis without hematuria  Painful urination  Patient with ongoing urinary symptoms.  Has had on and off since December 2020 but has not gotten and investigated because the prior culture did not show any growth.  I did discuss with her that patients can have infections that do not show evidence of bacterial growth on the culture.  It does seem like her symptoms in December had improved with nitrofurantoin.  Unclear if she could have ongoing infection but urinalysis is suspicious today.    Will treat empirically with nitrofurantoin while awaiting urine culture.  Did discuss that if her symptoms resolve and are gone for 3 to 4 months before they recur, we can treat empirically with antibiotics in the future.  Should not have more than 3-4 bladder infections in a year.  If symptoms recur again within 3 months or do not resolve fully with treatment, my recommendation would be a urology referral  - UA macro with reflex to Microscopic and Culture - Clinc Collect  - Urine Microscopic  - Urine Culture  - nitroFURantoin macrocrystal-monohydrate (MACROBID) 100 MG capsule; Take 1 capsule (100 mg) by mouth 2 times daily for 7 days                 No follow-ups on file.    Dom Novoa MD  [facility]    Cruzito Cardoza is a 57 year old accompanied by her self, presenting for the following health issues:  UTI (Frequency.)      UTI  This is a recurrent problem. The current episode started in the past 7 days. The problem occurs daily. The problem has been unchanged. Nothing aggravates the symptoms. She has tried nothing for the symptoms. The treatment provided no relief.   History of Present Illness       Reason for visit:  Urinary urgency and frequency  Symptom onset:  3-7 days ago  Symptoms include:  Urinary urgency and frequency  Symptom intensity:  Moderate  Symptom progression:  Staying the same  Had these symptoms before:  Yes  Has tried/received treatment  "for these symptoms:  Yes  Previous treatment was successful:  No  What makes it worse:  No  What makes it better:  Not really    She eats 2-3 servings of fruits and vegetables daily.She consumes 1 sweetened beverage(s) daily.She exercises with enough effort to increase her heart rate 20 to 29 minutes per day.  She exercises with enough effort to increase her heart rate 3 or less days per week.   She is taking medications regularly.             Review of Systems   Constitutional: Negative.    HENT: Negative.    Eyes: Negative.    Respiratory: Negative.    Cardiovascular: Negative.    Gastrointestinal: Negative.    Endocrine: Negative.    Breasts:  negative.    Genitourinary: Negative.    Musculoskeletal: Negative.    Skin: Negative.    Allergic/Immunologic: Negative.    Neurological: Negative.    Hematological: Negative.    Psychiatric/Behavioral: Negative.             Objective    /81   Pulse 73   Temp 98.7  F (37.1  C) (Oral)   Resp 10   Ht 1.694 m (5' 6.69\")   Wt 68.1 kg (150 lb 3.2 oz)   SpO2 100%   BMI 23.74 kg/m    Body mass index is 23.74 kg/m .  Physical Exam   Alert cooperative in no acute distress                    "

## 2022-11-16 LAB — BACTERIA UR CULT: NORMAL

## 2022-11-21 DIAGNOSIS — N30.00 ACUTE CYSTITIS WITHOUT HEMATURIA: Primary | ICD-10-CM

## 2022-11-21 RX ORDER — CIPROFLOXACIN 250 MG/1
250 TABLET, FILM COATED ORAL 2 TIMES DAILY
Qty: 6 TABLET | Refills: 0 | Status: SHIPPED | OUTPATIENT
Start: 2022-11-21 | End: 2022-11-24

## 2023-01-10 ENCOUNTER — LAB REQUISITION (OUTPATIENT)
Dept: LAB | Facility: CLINIC | Age: 58
End: 2023-01-10
Payer: COMMERCIAL

## 2023-01-10 DIAGNOSIS — Z87.440 PERSONAL HISTORY OF URINARY (TRACT) INFECTIONS: ICD-10-CM

## 2023-01-10 LAB
ALBUMIN UR-MCNC: NEGATIVE MG/DL
APPEARANCE UR: CLEAR
BILIRUB UR QL STRIP: NEGATIVE
COLOR UR AUTO: NORMAL
GLUCOSE UR STRIP-MCNC: NEGATIVE MG/DL
HGB UR QL STRIP: NEGATIVE
KETONES UR STRIP-MCNC: NEGATIVE MG/DL
LEUKOCYTE ESTERASE UR QL STRIP: NEGATIVE
NITRATE UR QL: NEGATIVE
PH UR STRIP: 7 [PH] (ref 5–7)
RBC URINE: 1 /HPF
SP GR UR STRIP: 1 (ref 1–1.03)
SQUAMOUS EPITHELIAL: <1 /HPF
UROBILINOGEN UR STRIP-MCNC: NORMAL MG/DL
WBC URINE: 2 /HPF

## 2023-01-10 PROCEDURE — 87086 URINE CULTURE/COLONY COUNT: CPT | Mod: ORL | Performed by: OBSTETRICS & GYNECOLOGY

## 2023-01-10 PROCEDURE — 81001 URINALYSIS AUTO W/SCOPE: CPT | Mod: ORL | Performed by: OBSTETRICS & GYNECOLOGY

## 2023-01-11 ENCOUNTER — TELEPHONE (OUTPATIENT)
Dept: FAMILY MEDICINE | Facility: CLINIC | Age: 58
End: 2023-01-11

## 2023-01-11 NOTE — TELEPHONE ENCOUNTER
Patient called in and wanted us to send her referral to [facility] in [place] to see Dr. Mercedes Howard.     I faxed referral to [phone]

## 2023-01-12 LAB — BACTERIA UR CULT: NO GROWTH

## 2023-02-09 ENCOUNTER — TRANSFERRED RECORDS (OUTPATIENT)
Dept: HEALTH INFORMATION MANAGEMENT | Facility: CLINIC | Age: 58
End: 2023-02-09
Payer: COMMERCIAL

## 2023-04-11 ENCOUNTER — OFFICE VISIT (OUTPATIENT)
Dept: FAMILY MEDICINE | Facility: CLINIC | Age: 58
End: 2023-04-11
Payer: COMMERCIAL

## 2023-04-11 VITALS
BODY MASS INDEX: 22.44 KG/M2 | WEIGHT: 143 LBS | RESPIRATION RATE: 16 BRPM | SYSTOLIC BLOOD PRESSURE: 128 MMHG | TEMPERATURE: 98.9 F | HEIGHT: 67 IN | OXYGEN SATURATION: 98 % | DIASTOLIC BLOOD PRESSURE: 80 MMHG | HEART RATE: 80 BPM

## 2023-04-11 DIAGNOSIS — J06.9 VIRAL UPPER RESPIRATORY INFECTION: ICD-10-CM

## 2023-04-11 DIAGNOSIS — F41.8 MIXED ANXIETY AND DEPRESSIVE DISORDER: Primary | ICD-10-CM

## 2023-04-11 DIAGNOSIS — F51.01 PRIMARY INSOMNIA: ICD-10-CM

## 2023-04-11 PROCEDURE — 99214 OFFICE O/P EST MOD 30 MIN: CPT | Performed by: PHYSICIAN ASSISTANT

## 2023-04-11 RX ORDER — LEVALBUTEROL TARTRATE 45 UG/1
2 AEROSOL, METERED ORAL EVERY 4 HOURS PRN
COMMUNITY

## 2023-04-11 RX ORDER — LORAZEPAM 1 MG/1
1 TABLET ORAL EVERY 8 HOURS PRN
Qty: 30 TABLET | Refills: 0 | Status: SHIPPED | OUTPATIENT
Start: 2023-04-11 | End: 2023-04-26

## 2023-04-11 RX ORDER — POLYMYXIN B SULFATE AND TRIMETHOPRIM 1; 10000 MG/ML; [USP'U]/ML
SOLUTION OPHTHALMIC
COMMUNITY
Start: 2023-04-06 | End: 2023-04-26

## 2023-04-11 RX ORDER — DOXYCYCLINE 100 MG/1
CAPSULE ORAL
COMMUNITY
Start: 2023-02-13 | End: 2023-04-11

## 2023-04-11 RX ORDER — ESCITALOPRAM OXALATE 10 MG/1
10 TABLET ORAL DAILY
Qty: 30 TABLET | Refills: 2 | Status: SHIPPED | OUTPATIENT
Start: 2023-04-11 | End: 2023-04-26

## 2023-04-11 RX ORDER — BENZONATATE 200 MG/1
200 CAPSULE ORAL 3 TIMES DAILY PRN
COMMUNITY
End: 2023-04-26

## 2023-04-11 RX ORDER — TRAZODONE HYDROCHLORIDE 50 MG/1
50 TABLET, FILM COATED ORAL AT BEDTIME
Qty: 30 TABLET | Refills: 2 | Status: SHIPPED | OUTPATIENT
Start: 2023-04-11 | End: 2023-04-26

## 2023-04-11 RX ORDER — ESTRADIOL 0.1 MG/G
CREAM VAGINAL
COMMUNITY
Start: 2023-02-09 | End: 2023-04-11

## 2023-04-11 RX ORDER — MULTIVIT-MIN/IRON/FOLIC ACID/K 18-600-40
CAPSULE ORAL
COMMUNITY
End: 2023-05-10

## 2023-04-11 ASSESSMENT — ANXIETY QUESTIONNAIRES
4. TROUBLE RELAXING: MORE THAN HALF THE DAYS
2. NOT BEING ABLE TO STOP OR CONTROL WORRYING: NEARLY EVERY DAY
GAD7 TOTAL SCORE: 17
6. BECOMING EASILY ANNOYED OR IRRITABLE: SEVERAL DAYS
IF YOU CHECKED OFF ANY PROBLEMS ON THIS QUESTIONNAIRE, HOW DIFFICULT HAVE THESE PROBLEMS MADE IT FOR YOU TO DO YOUR WORK, TAKE CARE OF THINGS AT HOME, OR GET ALONG WITH OTHER PEOPLE: VERY DIFFICULT
7. FEELING AFRAID AS IF SOMETHING AWFUL MIGHT HAPPEN: NEARLY EVERY DAY
3. WORRYING TOO MUCH ABOUT DIFFERENT THINGS: NEARLY EVERY DAY
1. FEELING NERVOUS, ANXIOUS, OR ON EDGE: NEARLY EVERY DAY
5. BEING SO RESTLESS THAT IT IS HARD TO SIT STILL: MORE THAN HALF THE DAYS
7. FEELING AFRAID AS IF SOMETHING AWFUL MIGHT HAPPEN: NEARLY EVERY DAY
8. IF YOU CHECKED OFF ANY PROBLEMS, HOW DIFFICULT HAVE THESE MADE IT FOR YOU TO DO YOUR WORK, TAKE CARE OF THINGS AT HOME, OR GET ALONG WITH OTHER PEOPLE?: VERY DIFFICULT

## 2023-04-11 ASSESSMENT — PATIENT HEALTH QUESTIONNAIRE - PHQ9
SUM OF ALL RESPONSES TO PHQ QUESTIONS 1-9: 20
SUM OF ALL RESPONSES TO PHQ QUESTIONS 1-9: 20
10. IF YOU CHECKED OFF ANY PROBLEMS, HOW DIFFICULT HAVE THESE PROBLEMS MADE IT FOR YOU TO DO YOUR WORK, TAKE CARE OF THINGS AT HOME, OR GET ALONG WITH OTHER PEOPLE: VERY DIFFICULT

## 2023-04-11 ASSESSMENT — ENCOUNTER SYMPTOMS
NERVOUS/ANXIOUS: 1
CHEST TIGHTNESS: 1
FATIGUE: 1
COUGH: 1

## 2023-04-25 ASSESSMENT — ANXIETY QUESTIONNAIRES
GAD7 TOTAL SCORE: 16
1. FEELING NERVOUS, ANXIOUS, OR ON EDGE: MORE THAN HALF THE DAYS
8. IF YOU CHECKED OFF ANY PROBLEMS, HOW DIFFICULT HAVE THESE MADE IT FOR YOU TO DO YOUR WORK, TAKE CARE OF THINGS AT HOME, OR GET ALONG WITH OTHER PEOPLE?: VERY DIFFICULT
4. TROUBLE RELAXING: NEARLY EVERY DAY
2. NOT BEING ABLE TO STOP OR CONTROL WORRYING: NEARLY EVERY DAY
5. BEING SO RESTLESS THAT IT IS HARD TO SIT STILL: SEVERAL DAYS
7. FEELING AFRAID AS IF SOMETHING AWFUL MIGHT HAPPEN: NEARLY EVERY DAY
GAD7 TOTAL SCORE: 16
7. FEELING AFRAID AS IF SOMETHING AWFUL MIGHT HAPPEN: NEARLY EVERY DAY
GAD7 TOTAL SCORE: 16
6. BECOMING EASILY ANNOYED OR IRRITABLE: SEVERAL DAYS
IF YOU CHECKED OFF ANY PROBLEMS ON THIS QUESTIONNAIRE, HOW DIFFICULT HAVE THESE PROBLEMS MADE IT FOR YOU TO DO YOUR WORK, TAKE CARE OF THINGS AT HOME, OR GET ALONG WITH OTHER PEOPLE: VERY DIFFICULT
3. WORRYING TOO MUCH ABOUT DIFFERENT THINGS: NEARLY EVERY DAY

## 2023-04-25 ASSESSMENT — PATIENT HEALTH QUESTIONNAIRE - PHQ9
10. IF YOU CHECKED OFF ANY PROBLEMS, HOW DIFFICULT HAVE THESE PROBLEMS MADE IT FOR YOU TO DO YOUR WORK, TAKE CARE OF THINGS AT HOME, OR GET ALONG WITH OTHER PEOPLE: SOMEWHAT DIFFICULT
SUM OF ALL RESPONSES TO PHQ QUESTIONS 1-9: 17
SUM OF ALL RESPONSES TO PHQ QUESTIONS 1-9: 17

## 2023-04-26 ENCOUNTER — OFFICE VISIT (OUTPATIENT)
Dept: FAMILY MEDICINE | Facility: CLINIC | Age: 58
End: 2023-04-26
Payer: COMMERCIAL

## 2023-04-26 VITALS
HEIGHT: 66 IN | BODY MASS INDEX: 22.95 KG/M2 | OXYGEN SATURATION: 99 % | WEIGHT: 142.8 LBS | HEART RATE: 90 BPM | SYSTOLIC BLOOD PRESSURE: 160 MMHG | DIASTOLIC BLOOD PRESSURE: 90 MMHG

## 2023-04-26 DIAGNOSIS — F41.8 MIXED ANXIETY AND DEPRESSIVE DISORDER: ICD-10-CM

## 2023-04-26 PROCEDURE — 99214 OFFICE O/P EST MOD 30 MIN: CPT | Performed by: FAMILY MEDICINE

## 2023-04-26 ASSESSMENT — PATIENT HEALTH QUESTIONNAIRE - PHQ9
10. IF YOU CHECKED OFF ANY PROBLEMS, HOW DIFFICULT HAVE THESE PROBLEMS MADE IT FOR YOU TO DO YOUR WORK, TAKE CARE OF THINGS AT HOME, OR GET ALONG WITH OTHER PEOPLE: SOMEWHAT DIFFICULT
SUM OF ALL RESPONSES TO PHQ QUESTIONS 1-9: 17

## 2023-04-26 ASSESSMENT — ANXIETY QUESTIONNAIRES: GAD7 TOTAL SCORE: 16

## 2023-04-26 NOTE — PROGRESS NOTES
Assessment & Plan     Mixed anxiety and depressive disorder  Patient counseled regarding external stressors, encouraged her to set boundaries, encouraged her to continue with therapy, encouraged her to have conversations with her support system but it is also reasonable to let them know if there are ways that there support has not been helpful.  Discussed medication at length.  On hydroxychloroquine which potentially could prolong QT interval.  She would prefer to stay off of other medications.  Her escitalopram and lorazepam are disposed.  She is going to continue counseling at this point.  She follows up with me in 6 weeks for her annual exam.  We will reevaluate at that time and if not finding situation be more manageable she will let me know.  Blood pressure is elevated today due to agitation.  We will recheck in June.        35 minutes spent by me on the date of the encounter doing chart review, history and exam, documentation and further activities per the note       Depression Screening Follow Up        4/25/2023     7:15 PM   PHQ   PHQ-9 Total Score 17   Q9: Thoughts of better off dead/self-harm past 2 weeks Not at all         4/25/2023     7:15 PM   Last PHQ-9   1.  Little interest or pleasure in doing things 2   2.  Feeling down, depressed, or hopeless 3   3.  Trouble falling or staying asleep, or sleeping too much 3   4.  Feeling tired or having little energy 2   5.  Poor appetite or overeating 2   6.  Feeling bad about yourself 2   7.  Trouble concentrating 2   8.  Moving slowly or restless 1   Q9: Thoughts of better off dead/self-harm past 2 weeks 0   PHQ-9 Total Score 17       Follow Up Actions Taken  Crisis resource information provided in After Visit Summary  Referred patient back to current mental health provider.         Dom Novoa MD  [facility]    Cruzito Cardoza is a 57 year old, presenting for the following health issues:  Follow Up (Seen 4/11 by  , still not improved. Head congestion, cough/cold. ) and Recheck Medication (Discuss meds)        2023     9:49 AM   Additional Questions   Roomed by Dior VERDUGO MA     History of Present Illness       Mental Health Follow-up:  Patient presents to follow-up on Depression & Anxiety.Patient's depression since last visit has been:  No change  The patient is not having other symptoms associated with depression.  Patient's anxiety since last visit has been:  No change  The patient is not having other symptoms associated with anxiety.  Any significant life events: relationship concerns  Patient is feeling anxious or having panic attacks.  Patient has no concerns about alcohol or drug use.    She eats 0-1 servings of fruits and vegetables daily.She consumes 1 sweetened beverage(s) daily.She exercises with enough effort to increase her heart rate 10 to 19 minutes per day.  She exercises with enough effort to increase her heart rate 3 or less days per week.   She is taking medications regularly.    Today's PHQ-9         PHQ-9 Total Score: 17    PHQ-9 Q9 Thoughts of better off dead/self-harm past 2 weeks :   Not at all    How difficult have these problems made it for you to do your work, take care of things at home, or get along with other people: Somewhat difficult  Today's NOELLE-7 Score: 16    Reviewed recent visit with Jhonny Pratt and medications prescribed.  Patient has been struggling due to stressful situation with her youngest sister.  Notes her youngest sister lives in [place] and has recently been evicted from her home along with her  and 12-year-old son.  Patient notes that she has not seen her younger sister since their mother  13 years ago.  She has never met her nephew.  She is frustrated with getting dragged into this difficult situation.  She is having a hard time understanding how her sister came to be homeless given that her sister and her brother-in-law both had jobs at the time  "they were evicted.  Their eldest sister is now going to pick them up and bring them to their hometown in [place].  There is not a good plan for what is going to happen at that point.  Patient is struggling with feeling obligated to help but also not wanting to give money.  She also notes that her  has had a strong reaction to this as well and has said that he does not want to send their money to them.    Patient does have a therapist.  She has been talking about this with the therapist.  When she was in 2 weeks ago she was given a prescription for escitalopram and lorazepam.  She has not taken either 1.  She did take the trazodone but it made her agitated and stimulated.  She is not currently taking any new medications.  She has not been sleeping well.  She does get some relief by taking a half a Unisom although it does cause mouth dryness            Review of Systems         Objective    BP (!) 160/90 (BP Location: Right arm, Patient Position: Sitting, Cuff Size: Adult Regular)   Pulse 90   Ht 1.676 m (5' 6\")   Wt 64.8 kg (142 lb 12.8 oz)   SpO2 99%   BMI 23.05 kg/m    Body mass index is 23.05 kg/m .  Physical Exam   Patient is alert and cooperative, is mildly agitated, briefly tearful, makes good eye contact and expresses her concerns thoroughly and with good insight.                    "

## 2023-05-09 ENCOUNTER — DOCUMENTATION ONLY (OUTPATIENT)
Dept: LAB | Facility: CLINIC | Age: 58
End: 2023-05-09
Payer: COMMERCIAL

## 2023-05-09 DIAGNOSIS — E06.3 HASHIMOTO'S THYROIDITIS: Primary | ICD-10-CM

## 2023-05-10 ENCOUNTER — OFFICE VISIT (OUTPATIENT)
Dept: FAMILY MEDICINE | Facility: CLINIC | Age: 58
End: 2023-05-10
Payer: COMMERCIAL

## 2023-05-10 VITALS
RESPIRATION RATE: 12 BRPM | TEMPERATURE: 98.9 F | OXYGEN SATURATION: 98 % | WEIGHT: 140.2 LBS | BODY MASS INDEX: 22.63 KG/M2 | DIASTOLIC BLOOD PRESSURE: 86 MMHG | SYSTOLIC BLOOD PRESSURE: 112 MMHG | HEART RATE: 68 BPM

## 2023-05-10 DIAGNOSIS — R19.7 BLOODY DIARRHEA: ICD-10-CM

## 2023-05-10 DIAGNOSIS — E06.3 HASHIMOTO'S THYROIDITIS: Primary | ICD-10-CM

## 2023-05-10 LAB
ALBUMIN SERPL BCG-MCNC: 4.3 G/DL (ref 3.5–5.2)
ALP SERPL-CCNC: 62 U/L (ref 35–104)
ALT SERPL W P-5'-P-CCNC: 19 U/L (ref 10–35)
ANION GAP SERPL CALCULATED.3IONS-SCNC: 11 MMOL/L (ref 7–15)
AST SERPL W P-5'-P-CCNC: 26 U/L (ref 10–35)
BASOPHILS # BLD AUTO: 0 10E3/UL (ref 0–0.2)
BASOPHILS NFR BLD AUTO: 1 %
BILIRUB SERPL-MCNC: 0.3 MG/DL
BUN SERPL-MCNC: 6.8 MG/DL (ref 6–20)
CALCIUM SERPL-MCNC: 9.5 MG/DL (ref 8.6–10)
CHLORIDE SERPL-SCNC: 108 MMOL/L (ref 98–107)
CREAT SERPL-MCNC: 0.81 MG/DL (ref 0.51–0.95)
DEPRECATED HCO3 PLAS-SCNC: 24 MMOL/L (ref 22–29)
EOSINOPHIL # BLD AUTO: 0.1 10E3/UL (ref 0–0.7)
EOSINOPHIL NFR BLD AUTO: 2 %
ERYTHROCYTE [DISTWIDTH] IN BLOOD BY AUTOMATED COUNT: 13.6 % (ref 10–15)
GFR SERPL CREATININE-BSD FRML MDRD: 84 ML/MIN/1.73M2
GLUCOSE SERPL-MCNC: 89 MG/DL (ref 70–99)
HCT VFR BLD AUTO: 39.1 % (ref 35–47)
HGB BLD-MCNC: 12.9 G/DL (ref 11.7–15.7)
IMM GRANULOCYTES # BLD: 0 10E3/UL
IMM GRANULOCYTES NFR BLD: 0 %
LYMPHOCYTES # BLD AUTO: 1.1 10E3/UL (ref 0.8–5.3)
LYMPHOCYTES NFR BLD AUTO: 24 %
MCH RBC QN AUTO: 28.8 PG (ref 26.5–33)
MCHC RBC AUTO-ENTMCNC: 33 G/DL (ref 31.5–36.5)
MCV RBC AUTO: 87 FL (ref 78–100)
MONOCYTES # BLD AUTO: 0.4 10E3/UL (ref 0–1.3)
MONOCYTES NFR BLD AUTO: 10 %
NEUTROPHILS # BLD AUTO: 2.8 10E3/UL (ref 1.6–8.3)
NEUTROPHILS NFR BLD AUTO: 63 %
PLATELET # BLD AUTO: 265 10E3/UL (ref 150–450)
POTASSIUM SERPL-SCNC: 3.8 MMOL/L (ref 3.4–5.3)
PROT SERPL-MCNC: 8.1 G/DL (ref 6.4–8.3)
RBC # BLD AUTO: 4.48 10E6/UL (ref 3.8–5.2)
SODIUM SERPL-SCNC: 143 MMOL/L (ref 136–145)
WBC # BLD AUTO: 4.4 10E3/UL (ref 4–11)

## 2023-05-10 PROCEDURE — 80053 COMPREHEN METABOLIC PANEL: CPT | Performed by: FAMILY MEDICINE

## 2023-05-10 PROCEDURE — 99214 OFFICE O/P EST MOD 30 MIN: CPT | Performed by: FAMILY MEDICINE

## 2023-05-10 PROCEDURE — 36415 COLL VENOUS BLD VENIPUNCTURE: CPT | Performed by: FAMILY MEDICINE

## 2023-05-10 PROCEDURE — 85025 COMPLETE CBC W/AUTO DIFF WBC: CPT | Mod: QW | Performed by: FAMILY MEDICINE

## 2023-05-10 RX ORDER — METRONIDAZOLE 500 MG/1
500 TABLET ORAL 2 TIMES DAILY
COMMUNITY
Start: 2023-05-05 | End: 2023-05-12

## 2023-05-10 RX ORDER — ONDANSETRON 4 MG/1
TABLET, ORALLY DISINTEGRATING ORAL PRN
COMMUNITY
Start: 2023-05-05 | End: 2023-07-14

## 2023-05-10 RX ORDER — LEVOTHYROXINE SODIUM 112 UG/1
112 TABLET ORAL DAILY
Qty: 90 TABLET | Refills: 3 | Status: SHIPPED | OUTPATIENT
Start: 2023-05-10 | End: 2024-05-13

## 2023-05-10 NOTE — PROGRESS NOTES
Assessment & Plan     Bloody diarrhea  Unclear etiology.  Could be infectious but had very sudden onset.  In reviewing prior colonoscopy she did have a history of hemorrhoids but description of the bleeding is more suspicious from lower colon than from hemorrhoids.  Bleeding has resolved and now having primarily diarrhea again.  Will check stool samples.  Labs are pending  - Enteric Bacteria and Virus Panel by [facility] Stool; Future  - C. difficile Toxin B PCR with reflex to C. difficile Antigen and Toxins A/B EIA; Future  - Comprehensive metabolic panel (BMP + Alb, Alk Phos, ALT, AST, Total. Bili, TP); Future  - CBC with platelets and differential; Future  - Enteric Bacteria and Virus Panel by [facility] Stool  - C. difficile Toxin B PCR with reflex to C. difficile Antigen and Toxins A/B EIA  - Comprehensive metabolic panel (BMP + Alb, Alk Phos, ALT, AST, Total. Bili, TP)  - CBC with platelets and differential    Hashimoto's thyroiditis  Had TSH done at outside hospital.  Up-to-date until next year.  Was within good range.  Refilled levothyroxine for a year.  - levothyroxine (SYNTHROID/LEVOTHROID) 112 MCG tablet; Take 1 tablet (112 mcg) by mouth daily           MED REC REQUIRED  Post Medication Reconciliation Status: discharge medications reconciled and changed, per note/orders      Dom Novoa MD  [facility]    Cruzito Cardoza is a 57 year old, presenting for the following health issues:  Hospital F/U        4/26/2023     9:49 AM   Additional Questions   Roomed by Dior VERDUGO MA       Hospital Follow-up Visit:    Hospital/Nursing Home/IP Rehab Facility: [facility] Emergency Department  Date of Admission: 05/05/2023  Date of Discharge: not admitted.  Reason(s) for Admission:     Abdominal Pain     Diarrhea       Was your hospitalization related to COVID-19? No   Problems taking medications regularly:  None  Medication changes since discharge: None  Problems adhering to  non-medication therapy:  None    Summary of hospitalization: Patient seen in ER at outside facility due to episode of lower GI bleeding.  Started after severe diarrhea.  Since that time bleeding has resolved for the past 48 hours but still having loose watery stools with any oral intake.  No fevers.  No significant abdominal pain.  Feels very tired and weak.  Reviewed outside labs.  Overall stable.  Normal TSH.  No evidence of anemia.  Diagnostic Tests/Treatments reviewed.  Follow up needed: none  Other Healthcare Providers Involved in Patient s Care:         None  Update since discharge: improved.         Plan of care communicated with patient                   Review of Systems         Objective    /86   Pulse 68   Temp 98.9  F (37.2  C)   Resp 12   Wt 63.6 kg (140 lb 3.2 oz)   LMP  (LMP Unknown)   SpO2 98%   BMI 22.63 kg/m    Body mass index is 22.63 kg/m .  Physical Exam   Alert cooperative no acute distress  Oral mucosa is dry, no evidence of thrush, normal buccal mucosa  Patient is slightly pale  Vitals reviewed and stable

## 2023-05-11 ENCOUNTER — APPOINTMENT (OUTPATIENT)
Dept: LAB | Facility: CLINIC | Age: 58
End: 2023-05-11
Payer: COMMERCIAL

## 2023-05-11 LAB — C DIFF TOX B STL QL: NEGATIVE

## 2023-05-11 PROCEDURE — 87506 IADNA-DNA/RNA PROBE TQ 6-11: CPT | Performed by: FAMILY MEDICINE

## 2023-05-11 PROCEDURE — 87493 C DIFF AMPLIFIED PROBE: CPT | Mod: 59 | Performed by: FAMILY MEDICINE

## 2023-05-26 DIAGNOSIS — K29.50 OTHER CHRONIC GASTRITIS WITHOUT HEMORRHAGE: ICD-10-CM

## 2023-05-26 NOTE — TELEPHONE ENCOUNTER
"      Last office visit: 5/10/2023     Future Appointments 5/26/2023 - 11/22/2023      Date Visit Type Length Department Provider     6/14/2023  1:00 PM PREVENTATIVE ADULT 30 min RVFL FP/IM/Dom Cleary MD    Location Instructions:     [facility], one block north of [facility] and the [facility]. The clinic shares a building with the [facility] [employer]; use the clinic s parking lot and entrance on the building s south side.                    Requested Prescriptions   Pending Prescriptions Disp Refills     omeprazole (PRILOSEC) 20 MG DR capsule [Pharmacy Med Name: OMEPRAZOLE 20MG CAPSULE DR] 90 capsule 3     Sig: TAKE ONE CAPSULE (20 MG) BY MOUTH DAILY       PPI Protocol Passed - 5/26/2023  8:25 AM        Passed - Not on Clopidogrel (unless Pantoprazole ordered)        Passed - No diagnosis of osteoporosis on record        Passed - Recent (12 mo) or future (30 days) visit within the authorizing provider's specialty     Patient has had an office visit with the authorizing provider or a provider within the authorizing providers department within the previous 12 mos or has a future within next 30 days. See \"Patient Info\" tab in inbasket, or \"Choose Columns\" in Meds & Orders section of the refill encounter.              Passed - Medication is active on med list        Passed - Patient is age 18 or older        Passed - No active pregnacy on record        Passed - No positive pregnancy test in past 12 months                   "

## 2023-06-21 ENCOUNTER — LAB REQUISITION (OUTPATIENT)
Dept: LAB | Facility: CLINIC | Age: 58
End: 2023-06-21
Payer: COMMERCIAL

## 2023-06-21 DIAGNOSIS — Z12.4 ENCOUNTER FOR SCREENING FOR MALIGNANT NEOPLASM OF CERVIX: ICD-10-CM

## 2023-06-21 PROCEDURE — G0145 SCR C/V CYTO,THINLAYER,RESCR: HCPCS | Mod: ORL | Performed by: OBSTETRICS & GYNECOLOGY

## 2023-06-23 ENCOUNTER — LAB REQUISITION (OUTPATIENT)
Dept: LAB | Facility: CLINIC | Age: 58
End: 2023-06-23
Payer: COMMERCIAL

## 2023-06-23 DIAGNOSIS — R39.9 UNSPECIFIED SYMPTOMS AND SIGNS INVOLVING THE GENITOURINARY SYSTEM: ICD-10-CM

## 2023-06-23 LAB
MUCOUS THREADS #/AREA URNS LPF: PRESENT /LPF
RBC URINE: <1 /HPF
WBC URINE: 2 /HPF

## 2023-06-23 PROCEDURE — 87086 URINE CULTURE/COLONY COUNT: CPT | Mod: ORL | Performed by: OBSTETRICS & GYNECOLOGY

## 2023-06-23 PROCEDURE — 81015 MICROSCOPIC EXAM OF URINE: CPT | Mod: ORL | Performed by: OBSTETRICS & GYNECOLOGY

## 2023-06-25 LAB — BACTERIA UR CULT: NO GROWTH

## 2023-06-26 LAB
BKR LAB AP GYN ADEQUACY: NORMAL
BKR LAB AP GYN INTERPRETATION: NORMAL
BKR LAB AP HPV REFLEX: NORMAL
BKR LAB AP LMP: NORMAL
BKR LAB AP PREVIOUS ABNL DX: NORMAL
BKR LAB AP PREVIOUS ABNORMAL: NORMAL
PATH REPORT.COMMENTS IMP SPEC: NORMAL
PATH REPORT.COMMENTS IMP SPEC: NORMAL
PATH REPORT.RELEVANT HX SPEC: NORMAL

## 2023-07-14 DIAGNOSIS — F41.8 MIXED ANXIETY AND DEPRESSIVE DISORDER: Primary | ICD-10-CM

## 2023-07-20 ENCOUNTER — LAB REQUISITION (OUTPATIENT)
Dept: LAB | Facility: CLINIC | Age: 58
End: 2023-07-20

## 2023-07-20 DIAGNOSIS — R87.618 OTHER ABNORMAL CYTOLOGICAL FINDINGS ON SPECIMENS FROM CERVIX UTERI: ICD-10-CM

## 2023-07-20 PROCEDURE — 88305 TISSUE EXAM BY PATHOLOGIST: CPT | Performed by: PATHOLOGY

## 2023-07-24 LAB
PATH REPORT.COMMENTS IMP SPEC: NORMAL
PATH REPORT.COMMENTS IMP SPEC: NORMAL
PATH REPORT.FINAL DX SPEC: NORMAL
PATH REPORT.GROSS SPEC: NORMAL
PATH REPORT.MICROSCOPIC SPEC OTHER STN: NORMAL
PATH REPORT.RELEVANT HX SPEC: NORMAL
PHOTO IMAGE: NORMAL

## 2023-07-26 ENCOUNTER — OFFICE VISIT (OUTPATIENT)
Dept: FAMILY MEDICINE | Facility: CLINIC | Age: 58
End: 2023-07-26
Payer: COMMERCIAL

## 2023-07-26 VITALS
SYSTOLIC BLOOD PRESSURE: 120 MMHG | BODY MASS INDEX: 23.05 KG/M2 | RESPIRATION RATE: 14 BRPM | DIASTOLIC BLOOD PRESSURE: 70 MMHG | TEMPERATURE: 99 F | HEART RATE: 84 BPM | HEIGHT: 66 IN | WEIGHT: 143.4 LBS | OXYGEN SATURATION: 98 %

## 2023-07-26 DIAGNOSIS — M54.10 BACK PAIN WITH LEFT-SIDED RADICULOPATHY: Primary | ICD-10-CM

## 2023-07-26 DIAGNOSIS — K29.50 CHRONIC GASTRITIS WITHOUT BLEEDING, UNSPECIFIED GASTRITIS TYPE: ICD-10-CM

## 2023-07-26 PROCEDURE — 99213 OFFICE O/P EST LOW 20 MIN: CPT | Performed by: PHYSICIAN ASSISTANT

## 2023-07-26 RX ORDER — CYCLOBENZAPRINE HCL 5 MG
5 TABLET ORAL 3 TIMES DAILY PRN
Qty: 30 TABLET | Refills: 1 | Status: SHIPPED | OUTPATIENT
Start: 2023-07-26 | End: 2023-09-29

## 2023-07-26 RX ORDER — METHYLPREDNISOLONE 4 MG
TABLET, DOSE PACK ORAL
Qty: 21 TABLET | Refills: 0 | Status: SHIPPED | OUTPATIENT
Start: 2023-07-26 | End: 2024-04-28

## 2023-07-26 ASSESSMENT — ENCOUNTER SYMPTOMS: BACK PAIN: 1

## 2023-07-26 NOTE — PROGRESS NOTES
Assessment & Plan     Back pain with left-sided radiculopathy  Medrol and cyclobenzaprine as ordered.    Pt referral.    Follow-up with pcp for persistent or worsening sx.    At the end of the encounter, I discussed results, diagnosis, medications. Discussed red flags for immediate return to clinic/ER, as well as indications for follow up if no improvement. Patient understood and agreed to plan. Patient was stable for discharge      Gastritis: refilled PPI.    Caution re: oral steroids and dyspepsia. Should take with food and make sure taking PPI.      - methylPREDNISolone (MEDROL DOSEPAK) 4 MG tablet therapy pack  Dispense: 21 tablet; Refill: 0  - cyclobenzaprine (FLEXERIL) 5 MG tablet  Dispense: 30 tablet; Refill: 1  - Physical Therapy Referral  - omeprazole (PRILOSEC) 20 MG DR capsule  Dispense: 90 capsule; Refill: 3         TRACEY [facility]    Cruzito Cardoza is a 57 year old female who presents to clinic today for the following health issues:  Chief Complaint   Patient presents with    Back Pain     Low back pain with right buttock and thigh pain. Injured June 10th due to an incorrect twist.     History of Present Illness       Reason for visit:  Pain andnumb feeling left leg and thigh for past week/low back pain from lifting injury for mor than a month  Symptom onset:  3-4 weeks ago  Symptoms include:  Pain / jolting at times left leg inside of thigh and numb feeling  Symptom intensity:  Moderate  Symptom progression:  Worsening  Had these symptoms before:  No  What makes it worse:  Sitting forvery long  What makes it better:  Motrin and tylenol help some    She eats 2-3 servings of fruits and vegetables daily.She consumes 1 sweetened beverage(s) daily.She exercises with enough effort to increase her heart rate 30 to 60 minutes per day.  She exercises with enough effort to increase her heart rate 4 days per week.   She is taking medications regularly.  Pt is  "a 57 year old female with hx of sjogren's syndrome, on immune suppressants, who presents with concern re: back pain x 1 week.  Stated low back about 3 weeks ago but worsened about 1 week ago.    Last week developed radiculopathy into the lateral thigh on theL and tenderness on the medial thigh.  Travels to the knee.    Sitting worse, positional changes worse.  Standing worse.  Ice not helpful.    Heat helpful.  No weakness.    No B/B sensation problems.   Sleep:   No kal area paresthesia.     Doing a lot of lifting repetitively, bending, caring for grandchildren.          Review of Systems   Musculoskeletal:  Positive for back pain.     Patient Active Problem List   Diagnosis    Gastritis    Hashimoto's thyroiditis    Sjogren's syndrome (H)     Current Outpatient Medications   Medication    cevimeline (EVOXAC) 30 mg capsule    cyclobenzaprine (FLEXERIL) 5 MG tablet    hydroxychloroquine (PLAQUENIL) 200 mg tablet    levothyroxine (SYNTHROID/LEVOTHROID) 112 MCG tablet    methylPREDNISolone (MEDROL DOSEPAK) 4 MG tablet therapy pack    Multiple Vitamin (MULTIVITAMIN ADULT PO)    omeprazole (PRILOSEC) 20 MG DR capsule    omeprazole (PRILOSEC) 20 MG DR capsule    levalbuterol (XOPENEX HFA) 45 MCG/ACT inhaler     No current facility-administered medications for this visit.       Constitutional, HEENT, cardiovascular, pulmonary, gi and gu systems are negative, except as otherwise noted.      Objective    /70 (BP Location: Right arm, Patient Position: Sitting, Cuff Size: Adult Regular)   Pulse 84   Temp 99  F (37.2  C) (Oral)   Resp 14   Ht 1.676 m (5' 6\")   Wt 65 kg (143 lb 6.4 oz)   LMP  (LMP Unknown)   SpO2 98%   BMI 23.15 kg/m    Physical Exam   nad appears well  Ambulates well without antalgic gait, able to rise up and off exam table.   No midline T or L spine TTP.    No perivertebral muscle  L lumbar region, TTP   Muscular strength, sensation and DTR for LE are symetrical and WNL for the BLE  SLR " negative R, positive L   Figure 4 negative B.   Peripheral pulses intact, cap refill brisk.

## 2023-07-31 ENCOUNTER — TELEPHONE (OUTPATIENT)
Dept: FAMILY MEDICINE | Facility: CLINIC | Age: 58
End: 2023-07-31
Payer: COMMERCIAL

## 2023-07-31 NOTE — TELEPHONE ENCOUNTER
Called to speak with patient on clarifying PA request.  Pt does need Physical Therapy order submitted to her [employer] insurance for PA.    Pt states to get this addressed urgently to call [facility] at: [phone]    Patient would like a call back to be informed of status; as pt has an appt for PT at 240pm today 7/31/2023.    Patient Ph: [phone]

## 2023-07-31 NOTE — TELEPHONE ENCOUNTER
General Call    Contacts         Type Contact Phone/Fax    07/31/2023 09:38 AM CDT Phone (Incoming) Nani Gibson LUIS MANUEL (Self) [phone] (M)          Reason for Call:  prior auth request     What are your questions or concerns:  patient needs prior authorization for physical therapy, has an appointment today at 2:40.    Date of last appointment with provider: n/a    Could we send this information to you in [employer] [facility] or would you prefer to receive a phone call?:   Patient would prefer a phone call   Okay to leave a detailed message?: Yes at Cell number on file:    Telephone Information:   Mobile [phone]

## 2023-07-31 NOTE — TELEPHONE ENCOUNTER
Called patient and answered her questions.  After further discussion, patient decided to stay within [facility].  I have sent order/referral to scheduling.    Zenobia  [facility] Referrals  [facility]

## 2023-07-31 NOTE — TELEPHONE ENCOUNTER
Patient called back after calling insurance company. Relayed message from [employer] below.  Patient said she is now totally confused and is requesting a call back.  Message sent to Zenobia.  Zenobia will contact patient at [phone].  Patient said thank you.

## 2023-07-31 NOTE — TELEPHONE ENCOUNTER
Called patient to get additional information.  Patient is seeing JOSUE Delaney at [facility] ([id number]).  I submitted PA on the [facility] portal and it returned stating no PA required and that Cammie is out of network.  I did a search for who would be in network at [facility] and found Lamine Brito & Wolf Bowen would be.  Informed patient of this and she will cancel appt with Cammie and reschedule with Lamine or Wolf.    Decision ID# for returned [facility] PA is [id number].    Zenobia YANEZ Referrals  [facility]

## 2023-08-13 ENCOUNTER — HEALTH MAINTENANCE LETTER (OUTPATIENT)
Age: 58
End: 2023-08-13

## 2023-09-29 DIAGNOSIS — M54.10 BACK PAIN WITH LEFT-SIDED RADICULOPATHY: ICD-10-CM

## 2023-09-29 RX ORDER — CYCLOBENZAPRINE HCL 5 MG
TABLET ORAL
Qty: 30 TABLET | Refills: 1 | Status: SHIPPED | OUTPATIENT
Start: 2023-09-29 | End: 2024-04-28

## 2023-11-07 ENCOUNTER — MYC MEDICAL ADVICE (OUTPATIENT)
Dept: FAMILY MEDICINE | Facility: CLINIC | Age: 58
End: 2023-11-07
Payer: COMMERCIAL

## 2024-04-28 ENCOUNTER — APPOINTMENT (OUTPATIENT)
Dept: LAB | Facility: CLINIC | Age: 59
End: 2024-04-28
Payer: COMMERCIAL

## 2024-04-28 ENCOUNTER — OFFICE VISIT (OUTPATIENT)
Dept: URGENT CARE | Facility: URGENT CARE | Age: 59
End: 2024-04-28
Payer: COMMERCIAL

## 2024-04-28 VITALS
DIASTOLIC BLOOD PRESSURE: 76 MMHG | OXYGEN SATURATION: 99 % | BODY MASS INDEX: 24.36 KG/M2 | RESPIRATION RATE: 20 BRPM | SYSTOLIC BLOOD PRESSURE: 128 MMHG | WEIGHT: 150.9 LBS | TEMPERATURE: 99.7 F | HEART RATE: 89 BPM

## 2024-04-28 DIAGNOSIS — J10.1 INFLUENZA B: ICD-10-CM

## 2024-04-28 DIAGNOSIS — J98.01 BRONCHOSPASM: ICD-10-CM

## 2024-04-28 DIAGNOSIS — J15.9 BACTERIAL PNEUMONIA: Primary | ICD-10-CM

## 2024-04-28 LAB
BASOPHILS # BLD AUTO: 0 10E3/UL (ref 0–0.2)
BASOPHILS NFR BLD AUTO: 0 %
EOSINOPHIL # BLD AUTO: 0.1 10E3/UL (ref 0–0.7)
EOSINOPHIL NFR BLD AUTO: 1 %
ERYTHROCYTE [DISTWIDTH] IN BLOOD BY AUTOMATED COUNT: 13.9 % (ref 10–15)
FLUAV AG SPEC QL IA: NEGATIVE
FLUBV AG SPEC QL IA: POSITIVE
HCT VFR BLD AUTO: 35.6 % (ref 35–47)
HGB BLD-MCNC: 11.6 G/DL (ref 11.7–15.7)
IMM GRANULOCYTES # BLD: 0 10E3/UL
IMM GRANULOCYTES NFR BLD: 0 %
LYMPHOCYTES # BLD AUTO: 1 10E3/UL (ref 0.8–5.3)
LYMPHOCYTES NFR BLD AUTO: 11 %
MCH RBC QN AUTO: 28.4 PG (ref 26.5–33)
MCHC RBC AUTO-ENTMCNC: 32.6 G/DL (ref 31.5–36.5)
MCV RBC AUTO: 87 FL (ref 78–100)
MONOCYTES # BLD AUTO: 0.5 10E3/UL (ref 0–1.3)
MONOCYTES NFR BLD AUTO: 6 %
NEUTROPHILS # BLD AUTO: 7 10E3/UL (ref 1.6–8.3)
NEUTROPHILS NFR BLD AUTO: 81 %
PLATELET # BLD AUTO: 258 10E3/UL (ref 150–450)
RBC # BLD AUTO: 4.09 10E6/UL (ref 3.8–5.2)
WBC # BLD AUTO: 8.7 10E3/UL (ref 4–11)

## 2024-04-28 PROCEDURE — 36415 COLL VENOUS BLD VENIPUNCTURE: CPT

## 2024-04-28 PROCEDURE — 87205 SMEAR GRAM STAIN: CPT

## 2024-04-28 PROCEDURE — 87804 INFLUENZA ASSAY W/OPTIC: CPT

## 2024-04-28 PROCEDURE — 99214 OFFICE O/P EST MOD 30 MIN: CPT

## 2024-04-28 PROCEDURE — 87185 SC STD ENZYME DETCJ PER NZM: CPT

## 2024-04-28 PROCEDURE — 87077 CULTURE AEROBIC IDENTIFY: CPT | Mod: 59

## 2024-04-28 PROCEDURE — 85025 COMPLETE CBC W/AUTO DIFF WBC: CPT

## 2024-04-28 PROCEDURE — 87070 CULTURE OTHR SPECIMN AEROBIC: CPT

## 2024-04-28 PROCEDURE — 87186 SC STD MICRODIL/AGAR DIL: CPT

## 2024-04-28 RX ORDER — DOXYCYCLINE 100 MG/1
100 TABLET ORAL 2 TIMES DAILY
Qty: 14 TABLET | Refills: 0 | Status: SHIPPED | OUTPATIENT
Start: 2024-04-28 | End: 2024-05-05

## 2024-04-28 RX ORDER — CYCLOSPORINE 0.5 MG/ML
1 EMULSION OPHTHALMIC EVERY 12 HOURS
COMMUNITY
Start: 2023-11-01

## 2024-04-28 RX ORDER — LEVALBUTEROL TARTRATE 45 UG/1
2 AEROSOL, METERED ORAL EVERY 4 HOURS PRN
Qty: 15 G | Refills: 1 | Status: SHIPPED | OUTPATIENT
Start: 2024-04-28 | End: 2024-07-29

## 2024-04-28 NOTE — PROGRESS NOTES
Assessment & Plan     Influenza B  With influenza B.  Because of all symptoms with secondary influenza pneumonia.  She is outside treatment window.  Our x-ray is down.  Will get CBC and sputum studies on her and cover with doxycycline pending those results.  Follow-up in a few days if not better sooner if worse in any way.    Bacterial pneumonia  Concerns of a secondary bacterial pneumonia as above  - CBC with platelets and differential; Future  - Respiratory Aerobic Bacterial Culture with Gram Stain; Future  - doxycycline monohydrate (ADOXA) 100 MG tablet; Take 1 tablet (100 mg) by mouth 2 times daily for 7 days                  No follow-ups on file.    Subjective   Nain is a 58 year old, presenting for the following health issues: Patient is sick for several days.  Head congestion runny nose cough.  Body aches.  She notes that her left side of her chest seems tight.  Grandkids were sick.  She is bringing up follow sputum.  No chest pain.  No rashes.  No fevers for the last 2 days.  Cough (Productive cough, nasal congestion, chest congestion on left side, shortness of breath and fever 101 x 6 days.)    HPI               Review of Systems  Constitutional, HEENT, cardiovascular, pulmonary, gi and gu systems are negative, except as otherwise noted.      Objective    /76 (BP Location: Right arm, Patient Position: Sitting, Cuff Size: Adult Regular)   Pulse 89   Temp 99.7  F (37.6  C) (Tympanic)   Resp 20   Wt 68.4 kg (150 lb 14.4 oz)   LMP  (LMP Unknown)   SpO2 99%   BMI 24.36 kg/m    Body mass index is 24.36 kg/m .  Physical Exam   Alert no apparent distress ears today were normal nose reveals nasal mucosal swelling neck is supple without CV adenopathy.  Heart reveals regular rate rhythm lungs reveal few scattered rales bilaterally left worse than right  Neurological exam grossly intact        Signed Electronically by: [facility]

## 2024-04-28 NOTE — LETTER
April 28, 2024      Nani ISSA Arthur  [address] [facility] [address]        Dear ,    We are writing to inform you of your test results.    Your test results fall within the expected range(s) or remain unchanged from previous results.  Please continue with current treatment plan.    Resulted Orders   Influenza A & B Antigen - Clinic Collect   Result Value Ref Range    Influenza A antigen Negative Negative    Influenza B antigen Positive (A) Negative    Narrative    Test results must be correlated with clinical data. If necessary, results should be confirmed by a molecular assay or viral culture.   CBC with platelets and differential   Result Value Ref Range    WBC Count 8.7 4.0 - 11.0 10e3/uL    RBC Count 4.09 3.80 - 5.20 10e6/uL    Hemoglobin 11.6 (L) 11.7 - 15.7 g/dL    Hematocrit 35.6 35.0 - 47.0 %    MCV 87 78 - 100 fL    MCH 28.4 26.5 - 33.0 pg    MCHC 32.6 31.5 - 36.5 g/dL    RDW 13.9 10.0 - 15.0 %    Platelet Count 258 150 - 450 10e3/uL    % Neutrophils 81 %    % Lymphocytes 11 %    % Monocytes 6 %    % Eosinophils 1 %    % Basophils 0 %    % Immature Granulocytes 0 %    Absolute Neutrophils 7.0 1.6 - 8.3 10e3/uL    Absolute Lymphocytes 1.0 0.8 - 5.3 10e3/uL    Absolute Monocytes 0.5 0.0 - 1.3 10e3/uL    Absolute Eosinophils 0.1 0.0 - 0.7 10e3/uL    Absolute Basophils 0.0 0.0 - 0.2 10e3/uL    Absolute Immature Granulocytes 0.0 <=0.4 10e3/uL       If you have any questions or concerns, please call the clinic at the number listed above.       Sincerely,      Bennett Brown MD

## 2024-04-29 RX ORDER — CEFDINIR 300 MG/1
300 CAPSULE ORAL 2 TIMES DAILY
Qty: 20 CAPSULE | Refills: 0 | Status: SHIPPED | OUTPATIENT
Start: 2024-04-29 | End: 2024-05-13

## 2024-05-01 LAB
BACTERIA SPT CULT: ABNORMAL
GRAM STAIN RESULT: ABNORMAL

## 2024-05-08 ENCOUNTER — MYC MEDICAL ADVICE (OUTPATIENT)
Dept: FAMILY MEDICINE | Facility: CLINIC | Age: 59
End: 2024-05-08
Payer: COMMERCIAL

## 2024-05-08 SDOH — HEALTH STABILITY: PHYSICAL HEALTH: ON AVERAGE, HOW MANY DAYS PER WEEK DO YOU ENGAGE IN MODERATE TO STRENUOUS EXERCISE (LIKE A BRISK WALK)?: 3 DAYS

## 2024-05-08 SDOH — HEALTH STABILITY: PHYSICAL HEALTH: ON AVERAGE, HOW MANY MINUTES DO YOU ENGAGE IN EXERCISE AT THIS LEVEL?: 30 MIN

## 2024-05-08 ASSESSMENT — SOCIAL DETERMINANTS OF HEALTH (SDOH): HOW OFTEN DO YOU GET TOGETHER WITH FRIENDS OR RELATIVES?: THREE TIMES A WEEK

## 2024-05-13 ENCOUNTER — OFFICE VISIT (OUTPATIENT)
Dept: FAMILY MEDICINE | Facility: CLINIC | Age: 59
End: 2024-05-13
Payer: COMMERCIAL

## 2024-05-13 VITALS
BODY MASS INDEX: 23.21 KG/M2 | HEART RATE: 87 BPM | SYSTOLIC BLOOD PRESSURE: 120 MMHG | RESPIRATION RATE: 12 BRPM | WEIGHT: 147.9 LBS | HEIGHT: 67 IN | DIASTOLIC BLOOD PRESSURE: 72 MMHG | OXYGEN SATURATION: 97 %

## 2024-05-13 DIAGNOSIS — Z00.00 ROUTINE GENERAL MEDICAL EXAMINATION AT A HEALTH CARE FACILITY: Primary | ICD-10-CM

## 2024-05-13 DIAGNOSIS — K29.50 CHRONIC GASTRITIS WITHOUT BLEEDING, UNSPECIFIED GASTRITIS TYPE: ICD-10-CM

## 2024-05-13 DIAGNOSIS — E06.3 HASHIMOTO'S THYROIDITIS: ICD-10-CM

## 2024-05-13 LAB — TSH SERPL DL<=0.005 MIU/L-ACNC: 1.26 UIU/ML (ref 0.3–4.2)

## 2024-05-13 PROCEDURE — 99213 OFFICE O/P EST LOW 20 MIN: CPT | Mod: 25 | Performed by: FAMILY MEDICINE

## 2024-05-13 PROCEDURE — 84443 ASSAY THYROID STIM HORMONE: CPT | Performed by: FAMILY MEDICINE

## 2024-05-13 PROCEDURE — 99396 PREV VISIT EST AGE 40-64: CPT | Performed by: FAMILY MEDICINE

## 2024-05-13 PROCEDURE — 36415 COLL VENOUS BLD VENIPUNCTURE: CPT | Performed by: FAMILY MEDICINE

## 2024-05-13 RX ORDER — LEVOTHYROXINE SODIUM 112 UG/1
112 TABLET ORAL DAILY
Qty: 90 TABLET | Refills: 4 | Status: SHIPPED | OUTPATIENT
Start: 2024-05-13

## 2024-05-13 NOTE — PROGRESS NOTES
Preventive Care Visit  [facility]  Dom Novoa MD, Family Medicine  May 13, 2024      Assessment & Plan     Routine general medical examination at a health care facility  Health maintenance updated, preventive services reviewed, vaccines discussed, questions are answered, patient encouraged to continue annual wellness visits to review preventive services    Hashimoto's thyroiditis  Stable on current regimen, no symptoms noted.  Will do TSH today.    - levothyroxine (SYNTHROID/LEVOTHROID) 112 MCG tablet; Take 1 tablet (112 mcg) by mouth daily  - TSH; Future  - TSH WITH FREE T4 REFLEX    Chronic gastritis without bleeding, unspecified gastritis type  Ongoing symptoms of gastritis, next upper endoscopy is due in January.  Plans to do colonoscopy at the same time.  Recommend she review with her gastroenterologist whether she needs to do colonoscopy sooner.  - omeprazole (PRILOSEC) 20 MG DR capsule; Take 1 capsule (20 mg) by mouth daily              Counseling  Appropriate preventive services were discussed with this patient, including applicable screening as appropriate for fall prevention, nutrition, physical activity, Tobacco-use cessation, weight loss and cognition.  Checklist reviewing preventive services available has been given to the patient.  Reviewed patient's diet, addressing concerns and/or questions.   She is at risk for lack of exercise and has been provided with information to increase physical activity for the benefit of her well-being.   She is at risk for psychosocial distress and has been provided with information to reduce risk.           Cruzito Cardoza is a 58 year old, presenting for the following:  Physical        5/13/2024     8:30 AM   Additional Questions   Roomed by Katheryn ART CMA   Accompanied by None        Health Care Directive  Patient does not have a Health Care Directive or Living Will: Patient states has Advance Directive and will bring in a copy to  clinic.    Here for annual checkup  Recent pneumonia. Still mild cough. Started after influenza.   Due for TSH, no concerns about thyroid no symptoms.  Due for colonoscopy.  She will plan on doing in January with her upcoming upper endoscopy.  Otherwise up-to-date on preventative services            5/8/2024   General Health   How would you rate your overall physical health? Good   Feel stress (tense, anxious, or unable to sleep) Only a little   (!) STRESS CONCERN      5/8/2024   Nutrition   Three or more servings of calcium each day? Yes   Diet: Regular (no restrictions)   How many servings of fruit and vegetables per day? (!) 2-3   How many sweetened beverages each day? 0-1         5/8/2024   Exercise   Days per week of moderate/strenous exercise 3 days   Average minutes spent exercising at this level 30 min         5/8/2024   Social Factors   Frequency of gathering with friends or relatives Three times a week   Worry food won't last until get money to buy more No   Food not last or not have enough money for food? No   Do you have housing?  Yes   Are you worried about losing your housing? No   Lack of transportation? No   Unable to get utilities (heat,electricity)? No         5/8/2024   Fall Risk   Fallen 2 or more times in the past year? No   Trouble with walking or balance? No          5/8/2024   Dental   Dentist two times every year? Yes         5/8/2024   TB Screening   Were you born outside of the [place]? No         Today's PHQ-2 Score:       5/12/2024     5:50 PM   PHQ-2 ( 1999 [employer])   Q1: Little interest or pleasure in doing things 1   Q2: Feeling down, depressed or hopeless 1   PHQ-2 Score 2   Q1: Little interest or pleasure in doing things Several days   Q2: Feeling down, depressed or hopeless Several days   PHQ-2 Score 2           5/8/2024   Substance Use   Alcohol more than 3/day or more than 7/wk Not Applicable   Do you use any other substances recreationally? No     Social History     Tobacco Use     "Smoking status: Never     Passive exposure: Past    Smokeless tobacco: Never   Vaping Use    Vaping status: Never Used   Substance Use Topics    Alcohol use: Not Currently     Comment: occasional    Drug use: No                  5/8/2024   STI Screening   New sexual partner(s) since last STI/HIV test? No     History of abnormal Pap smear: NO - age 30-65 PAP every 5 years with negative HPV co-testing recommended        Latest Ref Rng & Units 6/21/2023     3:55 PM 6/13/2022     8:40 AM   PAP / HPV   PAP  Negative for Intraepithelial Lesion or Malignancy (NILM)  Negative for Intraepithelial Lesion or Malignancy (NILM)    HPV 16 DNA Negative  Negative    HPV 18 DNA Negative  Negative    Other HR HPV Negative  Negative      ASCVD Risk   The 10-year ASCVD risk score (Vinny SARGENT, et al., 2019) is: 1.8%    Values used to calculate the score:      Age: 58 years      Sex: Female      Is Non- : No      Diabetic: No      Tobacco smoker: No      Systolic Blood Pressure: 120 mmHg      Is BP treated: No      HDL Cholesterol: 55 mg/dL      Total Cholesterol: 146 mg/dL           Reviewed and updated as needed this visit by Provider   Tobacco  Allergies  Meds  Problems  Med Hx  Surg Hx  Fam Hx                     Objective    Exam  /72   Pulse 87   Resp 12   Ht 1.702 m (5' 7\")   Wt 67.1 kg (147 lb 14.4 oz)   LMP  (LMP Unknown)   SpO2 97%   BMI 23.16 kg/m     Estimated body mass index is 23.16 kg/m  as calculated from the following:    Height as of this encounter: 1.702 m (5' 7\").    Weight as of this encounter: 67.1 kg (147 lb 14.4 oz).    Physical Exam  GENERAL: alert and no distress  EYES: Eyes grossly normal to inspection, PERRL and conjunctivae and sclerae normal  HENT: ear canals and TM's normal, nose and mouth without ulcers or lesions  NECK: no adenopathy, no asymmetry, masses, or scars  RESP: lungs clear to auscultation - no rales, rhonchi or wheezes  CV: regular rate and " rhythm, normal S1 S2, no S3 or S4, no murmur, click or rub, no peripheral edema  ABDOMEN: soft, nontender, no hepatosplenomegaly, no masses and bowel sounds normal  MS: no gross musculoskeletal defects noted, no edema  SKIN: no suspicious lesions or rashes  NEURO: Normal strength and tone, mentation intact and speech normal  PSYCH: mentation appears normal, affect normal/bright        Signed Electronically by: Dom Novoa MD

## 2024-05-13 NOTE — PATIENT INSTRUCTIONS
"Preventive Care Advice   This is general advice we often give to help people stay healthy. Your care team may have specific advice just for you. Please talk to your care team about your own preventive care needs.  Lifestyle  Exercise at least 150 minutes each week (30 minutes a day, 5 days a week).  Do muscle strengthening activities 2 days a week. These help control your weight and prevent disease.  No smoking.  Wear sunscreen to prevent skin cancer.  Have your home tested for radon every 2 to 5 years. Radon is a colorless, odorless gas that can harm your lungs. To learn more, go to www.health.[facility].mn. and search for \"Radon in Homes.\"  Keep guns unloaded and locked up in a safe place like a safe or gun vault, or, use a gun lock and hide the keys. Always lock away bullets separately. To learn more, visit [employer].mn.gov and search for \"safe gun storage.\"  Nutrition  Eat 5 or more servings of fruits and vegetables each day.  Try wheat bread, brown rice and whole grain pasta (instead of white bread, rice, and pasta).  Get enough calcium and vitamin D. Check the label on foods and aim for 100% of the RDA (recommended daily allowance).  Regular exams  Have a dental exam and cleaning every 6 months.  See your health care team every year to talk about:  Any changes in your health.  Any medicines your care team has prescribed.  Preventive care, family planning, and ways to prevent chronic diseases.  Shots (vaccines)   HPV shots (up to age 26), if you've never had them before.  Hepatitis B shots (up to age 59), if you've never had them before.  COVID-19 shot: Get this shot when it's due.  Flu shot: Get a flu shot every year.  Tetanus shot: Get a tetanus shot every 10 years.  Pneumococcal, hepatitis A, and RSV shots: Ask your care team if you need these based on your risk.  Shingles shot (for age 50 and up).  General health tests  Diabetes screening:  Starting at age 35, Get screened for diabetes at least every 3 years.  If " you are younger than age 35, ask your care team if you should be screened for diabetes.  Cholesterol test: At age 39, start having a cholesterol test every 5 years, or more often if advised.  Bone density scan (DEXA): At age 50, ask your care team if you should have this scan for osteoporosis (brittle bones).  Hepatitis C: Get tested at least once in your life.  Abdominal aortic aneurysm screening: Talk to your doctor about having this screening if you:  Have ever smoked; and  Are biologically male; and  Are between the ages of 65 and 75.  STIs (sexually transmitted infections)  Before age 24: Ask your care team if you should be screened for STIs.  After age 24: Get screened for STIs if you're at risk. You are at risk for STIs (including HIV) if:  You are sexually active with more than one person.  You don't use condoms every time.  You or a partner was diagnosed with a sexually transmitted infection.  If you are at risk for HIV, ask about PrEP medicine to prevent HIV.  Get tested for HIV at least once in your life, whether you are at risk for HIV or not.  Cancer screening tests  Cervical cancer screening: If you have a cervix, begin getting regular cervical cancer screening tests at age 21. Most people who have regular screenings with normal results can stop after age 65. Talk about this with your provider.  Breast cancer scan (mammogram): If you've ever had breasts, begin having regular mammograms starting at age 40. This is a scan to check for breast cancer.  Colon cancer screening: It is important to start screening for colon cancer at age 45.  Have a colonoscopy test every 10 years (or more often if you're at risk) Or, ask your provider about stool tests like a FIT test every year or Cologuard test every 3 years.  To learn more about your testing options, visit: www.[employer]/819176.pdf.  For help making a decision, visit: giulia/pn50087.  Prostate cancer screening test: If you have a prostate and are age 55  to 69, ask your provider if you would benefit from a yearly prostate cancer screening test.  Lung cancer screening: If you are a current or former smoker age 50 to 80, ask your care team if ongoing lung cancer screenings are right for you.  For informational purposes only. Not to replace the advice of your health care provider. Copyright   2023 [employer]. All rights reserved. Clinically reviewed by the [facility] Transitions Program. Click Contact [id number] - REV 04/24.    Learning About Stress  What is stress?     Stress is your body's response to a hard situation. Your body can have a physical, emotional, or mental response. Stress is a fact of life for most people, and it affects everyone differently. What causes stress for you may not be stressful for someone else.  A lot of things can cause stress. You may feel stress when you go on a job interview, take a test, or run a race. This kind of short-term stress is normal and even useful. It can help you if you need to work hard or react quickly. For example, stress can help you finish an important job on time.  Long-term stress is caused by ongoing stressful situations or events. Examples of long-term stress include long-term health problems, ongoing problems at work, or conflicts in your family. Long-term stress can harm your health.  How does stress affect your health?  When you are stressed, your body responds as though you are in danger. It makes hormones that speed up your heart, make you breathe faster, and give you a burst of energy. This is called the fight-or-flight stress response. If the stress is over quickly, your body goes back to normal and no harm is done.  But if stress happens too often or lasts too long, it can have bad effects. Long-term stress can make you more likely to get sick, and it can make symptoms of some diseases worse. If you tense up when you are stressed, you may develop neck, shoulder, or low back pain. Stress is  linked to high blood pressure and heart disease.  Stress also harms your emotional health. It can make you agarwal, tense, or depressed. Your relationships may suffer, and you may not do well at work or school.  What can you do to manage stress?  You can try these things to help manage stress:   Do something active. Exercise or activity can help reduce stress. Walking is a great way to get started. Even everyday activities such as housecleaning or yard work can help.  Try yoga or sunny chi. These techniques combine exercise and meditation. You may need some training at first to learn them.  Do something you enjoy. For example, listen to music or go to a movie. Practice your hobby or do volunteer work.  Meditate. This can help you relax, because you are not worrying about what happened before or what may happen in the future.  Do guided imagery. Imagine yourself in any setting that helps you feel calm. You can use online videos, books, or a teacher to guide you.  Do breathing exercises. For example:  From a standing position, bend forward from the waist with your knees slightly bent. Let your arms dangle close to the floor.  Breathe in slowly and deeply as you return to a standing position. Roll up slowly and lift your head last.  Hold your breath for just a few seconds in the standing position.  Breathe out slowly and bend forward from the waist.  Let your feelings out. Talk, laugh, cry, and express anger when you need to. Talking with supportive friends or family, a counselor, or a dk leader about your feelings is a healthy way to relieve stress. Avoid discussing your feelings with people who make you feel worse.  Write. It may help to write about things that are bothering you. This helps you find out how much stress you feel and what is causing it. When you know this, you can find better ways to cope.  What can you do to prevent stress?  You might try some of these things to help prevent stress:  Manage your time.  "This helps you find time to do the things you want and need to do.  Get enough sleep. Your body recovers from the stresses of the day while you are sleeping.  Get support. Your family, friends, and community can make a difference in how you experience stress.  Limit your news feed. Avoid or limit time on social media or news that may make you feel stressed.  Do something active. Exercise or activity can help reduce stress. Walking is a great way to get started.  Where can you learn more?  Go to https://www.[employer].net/patiented  Enter N032 in the search box to learn more about \"Learning About Stress.\"  Current as of: October 24, 2023               Content Version: 14.0    [id number] [employer].   Care instructions adapted under license by your healthcare professional. If you have questions about a medical condition or this instruction, always ask your healthcare professional. [employer] disclaims any warranty or liability for your use of this information.      "

## 2024-07-29 ENCOUNTER — OFFICE VISIT (OUTPATIENT)
Dept: FAMILY MEDICINE | Facility: CLINIC | Age: 59
End: 2024-07-29
Payer: COMMERCIAL

## 2024-07-29 ENCOUNTER — ANCILLARY PROCEDURE (OUTPATIENT)
Dept: GENERAL RADIOLOGY | Facility: CLINIC | Age: 59
End: 2024-07-29
Attending: NURSE PRACTITIONER
Payer: COMMERCIAL

## 2024-07-29 VITALS
HEIGHT: 67 IN | RESPIRATION RATE: 18 BRPM | BODY MASS INDEX: 23.42 KG/M2 | HEART RATE: 99 BPM | OXYGEN SATURATION: 97 % | WEIGHT: 149.2 LBS | DIASTOLIC BLOOD PRESSURE: 78 MMHG | SYSTOLIC BLOOD PRESSURE: 138 MMHG | TEMPERATURE: 99.4 F

## 2024-07-29 DIAGNOSIS — J20.9 ACUTE WHEEZY BRONCHITIS: Primary | ICD-10-CM

## 2024-07-29 DIAGNOSIS — R05.1 ACUTE COUGH: ICD-10-CM

## 2024-07-29 PROCEDURE — 99213 OFFICE O/P EST LOW 20 MIN: CPT | Performed by: NURSE PRACTITIONER

## 2024-07-29 PROCEDURE — 71046 X-RAY EXAM CHEST 2 VIEWS: CPT | Mod: TC | Performed by: RADIOLOGY

## 2024-07-29 RX ORDER — PREDNISONE 20 MG/1
40 TABLET ORAL DAILY
Qty: 10 TABLET | Refills: 0 | Status: SHIPPED | OUTPATIENT
Start: 2024-07-29 | End: 2024-08-03

## 2024-07-29 NOTE — PROGRESS NOTES
Assessment & Plan     Acute wheezy bronchitis  3 week history of cough without fever or chills.  On exam she has wheezing in anterior right lung and posterior right lower lobe.  Mild crackles at the right upper lung, lung sounds clear on the left.  Given her exam and history of pneumonia, did pursue chest x-ray which on my interpretation is negative for pneumonia.  Recommend she continue albuterol inhaler, prednisone added for wheezing.  She can restart Tessalon Perles as well.  Recommend honey and pushing fluids.  If her symptoms worsen or she develops fever or chills, recommend she contact the clinic for consideration of antibiotic.  She is agreeable with this plan.  Of note, she was recently treated in April for secondary pneumonia following influenza with cefdinir and doxycycline.  - predniSONE (DELTASONE) 20 MG tablet; Take 2 tablets (40 mg) by mouth daily for 5 days    Acute cough  - XR Chest 2 Views; Future  - predniSONE (DELTASONE) 20 MG tablet; Take 2 tablets (40 mg) by mouth daily for 5 days                Subjective   Nani is a 58 year old, presenting for the following health issues:  URI (Cold sx x 3 weeks, pt had pneumonia a couple months ago and is having similar sx and SOB)        7/29/2024    12:40 PM   Additional Questions   Roomed by GRAHAM Tapia     Nani is a pleasant 58-year-old female who presents today for evaluation of cough.  She was diagnosed with influenza and associated pneumonia, sputum culture grew Hib and strep pneumo.  Treated with cefdinir and doxycycline.  X-ray was down at the time.  States her symptoms resolved but 3 weeks ago she started with a froggy throat and dry hacking cough.  This cough keeps her up at night.  She has lots of phlegm.  Denies fever or chills.  No shortness of breath, there is wheezing on exam.  She has left anterior chest discomfort with coughing.  She is taking Robitussin with doxylamine at night.  Overall she is not sleeping well.  Has Tessalon Perles at  "home but has not restarted.  She is a non-smoker.  No history of asthma or COPD.  She does have history of Sjogren's and is on hydroxyurea.      History of Present Illness       Reason for visit:  Cough - feels like it has settled in left side of my chest  Symptom onset:  1-2 weeks ago  Symptoms include:  Cough/ feels like it has settled in my left side of my chest  Symptom intensity:  Moderate  Symptom progression:  Worsening  Had these symptoms before:  Yes  Has tried/received treatment for these symptoms:  Yes  Previous treatment was successful:  Yes  Prior treatment description:  I had pnuemonia about 2 months ago - with influenza  What makes it worse:  Coughing/ cant seem to clear the area of congestion/irritation I feel on the left side of my chest  What makes it better:  Inhaler seems to help  open  up the airway to cough easier - otherwise nothing is quieting the cough/ not sleeping bery well for about 2 weeks    She eats 2-3 servings of fruits and vegetables daily.She consumes 1 sweetened beverage(s) daily.She exercises with enough effort to increase her heart rate 20 to 29 minutes per day.  She exercises with enough effort to increase her heart rate 3 or less days per week.   She is taking medications regularly.                 Review of Systems  Constitutional, HEENT, cardiovascular, pulmonary, gi and gu systems are negative, except as otherwise noted.      Objective    /78 (BP Location: Right arm, Patient Position: Sitting, Cuff Size: Adult Regular)   Pulse 99   Temp 99.4  F (37.4  C) (Tympanic)   Resp 18   Ht 1.702 m (5' 7\")   Wt 67.7 kg (149 lb 3.2 oz)   LMP  (LMP Unknown)   SpO2 97%   BMI 23.37 kg/m    Body mass index is 23.37 kg/m .  Physical Exam  Constitutional:       Appearance: Normal appearance.   HENT:      Head: Normocephalic and atraumatic.      Right Ear: Tympanic membrane normal.      Left Ear: Tympanic membrane normal.      Nose: Nose normal. No congestion or rhinorrhea.      " Mouth/Throat:      Mouth: Mucous membranes are moist.   Eyes:      Pupils: Pupils are equal, round, and reactive to light.   Cardiovascular:      Rate and Rhythm: Normal rate and regular rhythm.      Pulses: Normal pulses.      Heart sounds: Normal heart sounds.   Pulmonary:      Effort: No respiratory distress.      Breath sounds: Wheezing (right lobe anterior and right lower posterior) and rales (right upper) present.   Musculoskeletal:      Cervical back: Neck supple.   Lymphadenopathy:      Cervical: No cervical adenopathy.   Skin:     General: Skin is warm and dry.   Neurological:      Mental Status: She is alert and oriented to person, place, and time.   Psychiatric:         Mood and Affect: Mood normal.         Behavior: Behavior normal.            CXR - Reviewed and interpreted by me Normal- no infiltrates, effusions, pneumothoraces, cardiomegaly or masses  No results found for this visit on 07/29/24.        Signed Electronically by: ENE Van CNP

## 2024-09-09 ENCOUNTER — E-VISIT (OUTPATIENT)
Dept: FAMILY MEDICINE | Facility: CLINIC | Age: 59
End: 2024-09-09
Payer: COMMERCIAL

## 2024-09-09 DIAGNOSIS — N39.0 ACUTE UTI (URINARY TRACT INFECTION): Primary | ICD-10-CM

## 2024-09-09 PROCEDURE — 99421 OL DIG E/M SVC 5-10 MIN: CPT | Performed by: FAMILY MEDICINE

## 2024-09-09 RX ORDER — NITROFURANTOIN 25; 75 MG/1; MG/1
100 CAPSULE ORAL 2 TIMES DAILY
Qty: 10 CAPSULE | Refills: 0 | Status: SHIPPED | OUTPATIENT
Start: 2024-09-09 | End: 2024-09-14

## 2024-09-09 NOTE — PATIENT INSTRUCTIONS
Dear Nani Gibson    After reviewing your responses, I've been able to diagnose you with a urinary tract infection, which is a common infection of the bladder with bacteria.  This is not a sexually transmitted infection, though urinating immediately after intercourse can help prevent infections.  Drinking lots of fluids is also helpful to clear your current infection and prevent the next one.      It does look like we used nitrofurantoin in 2022 and we will use that again.    I have sent a prescription for antibiotics to your pharmacy to treat this infection.    It is important that you take all of your prescribed medication even if your symptoms are improving after a few doses.  Taking all of your medicine helps prevent the symptoms from returning.     If your symptoms worsen, you develop pain in your back or stomach, develop fevers, or are not improving in 5 days, please contact your primary care provider for an appointment or visit any of our convenient Walk-in or Urgent Care Centers to be seen, which can be found on our website here.    Thanks again for choosing us as your health care partner,    Dom Novoa MD

## 2024-09-30 ENCOUNTER — OFFICE VISIT (OUTPATIENT)
Dept: FAMILY MEDICINE | Facility: CLINIC | Age: 59
End: 2024-09-30
Payer: COMMERCIAL

## 2024-09-30 VITALS
HEIGHT: 67 IN | RESPIRATION RATE: 18 BRPM | OXYGEN SATURATION: 97 % | SYSTOLIC BLOOD PRESSURE: 118 MMHG | WEIGHT: 151 LBS | DIASTOLIC BLOOD PRESSURE: 82 MMHG | TEMPERATURE: 98.7 F | BODY MASS INDEX: 23.7 KG/M2 | HEART RATE: 85 BPM

## 2024-09-30 DIAGNOSIS — J02.9 SORE THROAT: Primary | ICD-10-CM

## 2024-09-30 LAB
DEPRECATED S PYO AG THROAT QL EIA: NEGATIVE
GROUP A STREP BY PCR: NOT DETECTED

## 2024-09-30 PROCEDURE — 99213 OFFICE O/P EST LOW 20 MIN: CPT | Performed by: NURSE PRACTITIONER

## 2024-09-30 PROCEDURE — 87651 STREP A DNA AMP PROBE: CPT | Performed by: NURSE PRACTITIONER

## 2024-09-30 ASSESSMENT — ENCOUNTER SYMPTOMS: SORE THROAT: 1

## 2024-09-30 NOTE — PATIENT INSTRUCTIONS
Sore Throat: Care Instructions  Overview     Infection by bacteria or a virus causes most sore throats. Cigarette smoke, dry air, air pollution, allergies, and yelling can also cause a sore throat. Sore throats can be painful and annoying. Fortunately, most sore throats go away on their own. If you have a bacterial infection, your doctor may prescribe antibiotics.  Follow-up care is a key part of your treatment and safety. Be sure to make and go to all appointments, and call your doctor if you are having problems. It's also a good idea to know your test results and keep a list of the medicines you take.  How can you care for yourself at home?  If your doctor prescribed antibiotics, take them as directed. Do not stop taking them just because you feel better. You need to take the full course of antibiotics.  Gargle with warm salt water several times a day to help reduce swelling and relieve pain. Mix 1/2 teaspoon of salt in 1 cup of warm water.  Take an over-the-counter pain medicine, such as acetaminophen (Tylenol), ibuprofen (Advil, Motrin), or naproxen (Aleve). Read and follow all instructions on the label.  Be careful when taking over-the-counter cold or flu medicines and Tylenol at the same time. Many of these medicines have acetaminophen, which is Tylenol. Read the labels to make sure that you are not taking more than the recommended dose. Too much acetaminophen (Tylenol) can be harmful.  Drink plenty of fluids. Fluids may help soothe an irritated throat. Hot fluids, such as tea or soup, may help decrease throat pain.  Use over-the-counter throat lozenges to soothe pain. Regular cough drops or hard candy may also help. These should not be given to young children because of the risk of choking.  Do not smoke or allow others to smoke around you. If you need help quitting, talk to your doctor about stop-smoking programs and medicines. These can increase your chances of quitting for good.  Use a vaporizer or  "humidifier to add moisture to your bedroom. Follow the directions for cleaning the machine.  When should you call for help?   Call your doctor now or seek immediate medical care if:    You have trouble breathing.     Your sore throat gets much worse on one side.     You have new or worse trouble swallowing.     You have a new or higher fever.   Watch closely for changes in your health, and be sure to contact your doctor if you do not get better as expected.  Where can you learn more?  Go to https://www.[employer].net/patiented  Enter U420 in the search box to learn more about \"Sore Throat: Care Instructions.\"  Current as of: September 27, 2023  Content Version: 14.2 2024 [facility] [email or web address].   Care instructions adapted under license by your healthcare professional. If you have questions about a medical condition or this instruction, always ask your healthcare professional. [employer] disclaims any warranty or liability for your use of this information.    Can also consider treating for allergies causing post nasal drip and sore throat with steroid nasal 2 sprays in each nostril daily (nasacort)    "

## 2024-09-30 NOTE — PROGRESS NOTES
"  Assessment & Plan     Sore throat  Negative for streptococcal infection.  Recommend home COVID-19 test.  Recommend symptom management with Tylenol or ibuprofen for sore throat, salt water gargles, lozenges, pushing fluids and rest.  - Streptococcus A Rapid Screen w/Reflex to PCR - Clinic Collect  - Group A Streptococcus PCR Throat Swab                Subjective   Nani is a 58 year old, presenting for the following health issues:  Pharyngitis (Sore throat x 6 days)        9/30/2024     9:09 AM   Additional Questions   Roomed by GRAHAM Tapia     Presents today for evaluation of sore throat.  Symptoms started 5 days ago.  Tylenol helps only a little.  She is able to eat and drink.  She had mild stuffy nose about a week ago.  All the grandchildren have stuffy noses.  She has been with her grandchildren a lot because her daughter is pregnant.  She does not give her daughter or grandchildren any illness.  She denies any fever or chills.  Coughing is triggered by talking.  Has not taken a COVID test.  Does have influenza B and pneumonia in April.    History of Present Illness       Reason for visit:  Very sore throat  Symptom onset:  3-7 days ago  Symptoms include:  Sore throat  Symptom intensity:  Moderate  Symptom progression:  Staying the same  Had these symptoms before:  No  What makes it better:  Tylenol helps   She is taking medications regularly.                 Review of Systems  Constitutional, HEENT, cardiovascular, pulmonary, gi and gu systems are negative, except as otherwise noted.      Objective    /82 (BP Location: Right arm, Patient Position: Sitting, Cuff Size: Adult Regular)   Pulse 85   Temp 98.7  F (37.1  C) (Tympanic)   Resp 18   Ht 1.702 m (5' 7\")   Wt 68.5 kg (151 lb)   LMP  (LMP Unknown)   SpO2 97%   BMI 23.65 kg/m    Body mass index is 23.65 kg/m .  Physical Exam  Constitutional:       General: She is not in acute distress.     Appearance: Normal appearance. She is not ill-appearing. "   HENT:      Head: Normocephalic and atraumatic.      Right Ear: Tympanic membrane normal.      Left Ear: Tympanic membrane normal.      Nose: Nose normal.      Mouth/Throat:      Mouth: Mucous membranes are moist.      Pharynx: Posterior oropharyngeal erythema present. No oropharyngeal exudate.   Eyes:      Pupils: Pupils are equal, round, and reactive to light.   Cardiovascular:      Rate and Rhythm: Normal rate and regular rhythm.   Pulmonary:      Effort: Pulmonary effort is normal. No respiratory distress.      Breath sounds: Normal breath sounds. No wheezing, rhonchi or rales.   Neurological:      Mental Status: She is alert and oriented to person, place, and time.   Psychiatric:         Mood and Affect: Mood normal.         Behavior: Behavior normal.            Results for orders placed or performed in visit on 09/30/24   Streptococcus A Rapid Screen w/Reflex to PCR - Clinic Collect     Status: Normal    Specimen: Throat; Swab   Result Value Ref Range    Group A Strep antigen Negative Negative   Group A Streptococcus PCR Throat Swab     Status: Normal    Specimen: Throat; Swab   Result Value Ref Range    Group A strep by PCR Not Detected Not Detected    Narrative    The Xpert Xpress Strep A test, performed on the [employer], is a rapid, qualitative in vitro diagnostic test for the detection of Streptococcus pyogenes (Group A ß-hemolytic Streptococcus, Strep A) in throat swab specimens from patients with signs and symptoms of pharyngitis. The Xpert Xpress Strep A test can be used as an aid in the diagnosis of Group A Streptococcal pharyngitis. The assay is not intended to monitor treatment for Group A Streptococcus infections. The Xpert Xpress Strep A test utilizes an automated real-time polymerase chain reaction (PCR) to detect Streptococcus pyogenes DNA.           Signed Electronically by: ENE Van CNP

## 2024-11-18 ENCOUNTER — MYC MEDICAL ADVICE (OUTPATIENT)
Dept: FAMILY MEDICINE | Facility: CLINIC | Age: 59
End: 2024-11-18
Payer: COMMERCIAL

## 2024-11-27 ENCOUNTER — ALLIED HEALTH/NURSE VISIT (OUTPATIENT)
Dept: FAMILY MEDICINE | Facility: CLINIC | Age: 59
End: 2024-11-27
Payer: COMMERCIAL

## 2024-11-27 DIAGNOSIS — Z23 NEED FOR VACCINATION: Primary | ICD-10-CM

## 2024-11-27 PROCEDURE — 90677 PCV20 VACCINE IM: CPT

## 2024-11-27 PROCEDURE — 99207 PR NO CHARGE NURSE ONLY: CPT

## 2024-11-27 PROCEDURE — 90471 IMMUNIZATION ADMIN: CPT

## 2024-12-16 ENCOUNTER — E-VISIT (OUTPATIENT)
Dept: URGENT CARE | Facility: CLINIC | Age: 59
End: 2024-12-16
Payer: COMMERCIAL

## 2024-12-16 ENCOUNTER — TELEPHONE (OUTPATIENT)
Dept: FAMILY MEDICINE | Facility: CLINIC | Age: 59
End: 2024-12-16
Payer: COMMERCIAL

## 2024-12-16 DIAGNOSIS — N39.0 ACUTE UTI (URINARY TRACT INFECTION): Primary | ICD-10-CM

## 2024-12-16 RX ORDER — NITROFURANTOIN 25; 75 MG/1; MG/1
100 CAPSULE ORAL 2 TIMES DAILY
Qty: 10 CAPSULE | Refills: 0 | Status: SHIPPED | OUTPATIENT
Start: 2024-12-16 | End: 2024-12-21

## 2024-12-16 NOTE — TELEPHONE ENCOUNTER
[place] and reports symptoms of UTI.  Patient calling to schedule appointment for when she returns.    Future Appointments 12/16/2024 - 6/14/2025        Date Visit Type Length Department Provider     12/17/2024  9:30 AM OFFICE VISIT 30 min [facility] FP/IM/Malorie Denton APRN CNP    Location Instructions:     [facility] is located at [facility], one block north of [facility] and the [facility]. The clinic shares a building with the [facility] [employer]; use the clinic s parking lot and entrance on the building s south side.

## 2024-12-17 NOTE — PATIENT INSTRUCTIONS
Dear Nani Gibson    After reviewing your responses, I've been able to diagnose you with a urinary tract infection, which is a common infection of the bladder with bacteria.  This is not a sexually transmitted infection, though urinating immediately after intercourse can help prevent infections.  Drinking lots of fluids is also helpful to clear your current infection and prevent the next one.      I have sent a prescription for antibiotics to your pharmacy to treat this infection.    It is important that you take all of your prescribed medication even if your symptoms are improving after a few doses.  Taking all of your medicine helps prevent the symptoms from returning.     If your symptoms worsen, you develop pain in your back or stomach, develop fevers, or are not improving in 5 days, please contact your primary care provider for an appointment or visit any of our convenient Walk-in or Urgent Care Centers to be seen, which can be found on our website here.    Thanks again for choosing us as your health care partner,    Jaylen Lopez MD, MD

## 2025-02-04 ENCOUNTER — OFFICE VISIT (OUTPATIENT)
Dept: FAMILY MEDICINE | Facility: CLINIC | Age: 60
End: 2025-02-04
Payer: COMMERCIAL

## 2025-02-04 ENCOUNTER — ANCILLARY PROCEDURE (OUTPATIENT)
Dept: GENERAL RADIOLOGY | Facility: CLINIC | Age: 60
End: 2025-02-04
Attending: NURSE PRACTITIONER
Payer: COMMERCIAL

## 2025-02-04 VITALS
BODY MASS INDEX: 22.93 KG/M2 | RESPIRATION RATE: 18 BRPM | HEART RATE: 104 BPM | OXYGEN SATURATION: 98 % | DIASTOLIC BLOOD PRESSURE: 78 MMHG | TEMPERATURE: 99.2 F | HEIGHT: 67 IN | SYSTOLIC BLOOD PRESSURE: 138 MMHG | WEIGHT: 146.1 LBS

## 2025-02-04 DIAGNOSIS — R52 BODY ACHES: ICD-10-CM

## 2025-02-04 DIAGNOSIS — R06.2 WHEEZING: ICD-10-CM

## 2025-02-04 DIAGNOSIS — R05.1 ACUTE COUGH: ICD-10-CM

## 2025-02-04 DIAGNOSIS — J10.1 INFLUENZA A: Primary | ICD-10-CM

## 2025-02-04 LAB
FLUAV AG SPEC QL IA: POSITIVE
FLUBV AG SPEC QL IA: NEGATIVE

## 2025-02-04 PROCEDURE — 87804 INFLUENZA ASSAY W/OPTIC: CPT | Mod: QW | Performed by: NURSE PRACTITIONER

## 2025-02-04 PROCEDURE — 71046 X-RAY EXAM CHEST 2 VIEWS: CPT | Mod: TC | Performed by: RADIOLOGY

## 2025-02-04 PROCEDURE — 99213 OFFICE O/P EST LOW 20 MIN: CPT | Performed by: NURSE PRACTITIONER

## 2025-02-04 RX ORDER — PREDNISONE 20 MG/1
20 TABLET ORAL DAILY
Qty: 5 TABLET | Refills: 0 | Status: SHIPPED | OUTPATIENT
Start: 2025-02-04 | End: 2025-02-09

## 2025-02-04 NOTE — PATIENT INSTRUCTIONS
Recommend Flonase (fluticasone) or saline nasal spray for nasal congestion as directed.  For cough, recommend Mucinex (guaifenesin) over-the-counter as directed.  For sore throat, headache, fever or body aches may take Tylenol or ibuprofen as directed.For sore throat, may also try salt water gargles, hot water with lemon and honey and lozenges.

## 2025-02-04 NOTE — PROGRESS NOTES
Assessment & Plan     Influenza A  Positive for influenza A.  Out of range for Tamiflu.  Exposed to flulike illness with symptoms starting 4 days ago with fever, chills, body aches, runny nose and cough.  Did have a high temperature initially, has come down to 101 to 102 last night.  No fever currently.  She started albuterol inhaler today for worsening cough and tightness in her chest.  Inspiratory wheezing on exam with rhonchi that cleared with coughing.  Pursued chest x-ray given history of pneumonia and chest symptoms, on my review negative for pneumonia.  Waiting on radiology overread.  For wheezing, continue albuterol inhaler and short course of prednisone prescribed.  Recommend she take with food and in the morning.  We discussed signs of second sickening the general course of illness for influenza.  Advised if she is not feeling better in the next 48 hours that she reach out to the clinic or follow-up.  Should also follow-up if she starts to feel better and then gets worse, in particular fever resolves and then returns.    Fever  - Influenza A & B Antigen - Clinic Collect    Body aches    - Influenza A & B Antigen - Clinic Collect    Acute cough  - XR Chest 2 Views; Future  - predniSONE (DELTASONE) 20 MG tablet; Take 1 tablet (20 mg) by mouth daily for 5 days.    Wheezing  - predniSONE (DELTASONE) 20 MG tablet; Take 1 tablet (20 mg) by mouth daily for 5 days.            Patient Instructions   Recommend Flonase (fluticasone) or saline nasal spray for nasal congestion as directed.  For cough, recommend Mucinex (guaifenesin) over-the-counter as directed.  For sore throat, headache, fever or body aches may take Tylenol or ibuprofen as directed.For sore throat, may also try salt water gargles, hot water with lemon and honey and lozenges.        Cruzito Cardoza is a 59 year old, presenting for the following health issues:  Flu (Poss influenza, fever and body aches and bad cough x 6 days/)        2/4/2025     "12:50 PM   Additional Questions   Roomed by GRAHAM Tapia presents today for evaluation of cough and fever.  Her symptoms started 4 days ago (on day 5).  She helps take care of her grandkids and her grandkids have been sick with high fever and lethargy.  Her daughter is expecting at the end of the month.  She has had fever, chills, body aches,runny nose, loss of appetitie.  Temperature was 102-104 the first couple days.  Today is afebrile, last night temp of 101-102.  She started using inhaler today.  History of pneumonia last year follow up influenza B.      History of Present Illness       Reason for visit:  High fever, body aches, influenza symptoms, now cough  Symptom onset:  3-7 days ago  Symptoms include:  High fever started last week Friday, temps to 103-104, body aches, chills, head congestion, runny nose, fatigue, dizziness at times, now have developed a deep feeling cough  Symptom intensity:  Moderate  Symptom progression:  Staying the same  Had these symptoms before:  Yes  Has tried/received treatment for these symptoms:  Yes  Previous treatment was successful:  Yes  Prior treatment description:  Last April ( I believe ) I had influenza with pneumonia, I am afraid of this again.  What makes it worse:  No  What makes it better:  Tylenol/ motrin help reliever fever for a little while - been doing this since Friday.  I just started my inhaler today that I have for cough prn   She is taking medications regularly.                 Review of Systems  Constitutional, HEENT, cardiovascular, pulmonary, gi and gu systems are negative, except as otherwise noted.      Objective    /78 (BP Location: Right arm, Patient Position: Sitting, Cuff Size: Adult Regular)   Pulse 104   Temp 99.2  F (37.3  C) (Tympanic)   Resp 18   Ht 1.702 m (5' 7\")   Wt 66.3 kg (146 lb 1.6 oz)   LMP  (LMP Unknown)   SpO2 98%   BMI 22.88 kg/m    Body mass index is 22.88 kg/m .  Physical Exam  Constitutional:       Appearance: " She is ill-appearing.   HENT:      Head: Normocephalic and atraumatic.      Right Ear: Tympanic membrane normal.      Left Ear: Tympanic membrane normal.   Cardiovascular:      Rate and Rhythm: Normal rate and regular rhythm.   Pulmonary:      Breath sounds: Wheezing (inspiratory) and rhonchi (clear with cough) present. Rales: LLQ.  Musculoskeletal:      Cervical back: Neck supple.   Lymphadenopathy:      Cervical: No cervical adenopathy.   Neurological:      Mental Status: She is alert.            Results for orders placed or performed in visit on 02/04/25 (from the past 24 hours)   Influenza A & B Antigen - Clinic Collect    Specimen: Nose; Swab   Result Value Ref Range    Influenza A antigen Positive (A) Negative    Influenza B antigen Negative Negative    Narrative    Test results must be correlated with clinical data. If necessary, results should be confirmed by a molecular assay or viral culture.           Signed Electronically by: ENE Van CNP

## 2025-02-10 ENCOUNTER — OFFICE VISIT (OUTPATIENT)
Dept: FAMILY MEDICINE | Facility: CLINIC | Age: 60
End: 2025-02-10
Payer: COMMERCIAL

## 2025-02-10 ENCOUNTER — ANCILLARY PROCEDURE (OUTPATIENT)
Dept: GENERAL RADIOLOGY | Facility: CLINIC | Age: 60
End: 2025-02-10
Attending: NURSE PRACTITIONER
Payer: COMMERCIAL

## 2025-02-10 VITALS
WEIGHT: 147.2 LBS | HEIGHT: 67 IN | RESPIRATION RATE: 16 BRPM | BODY MASS INDEX: 23.1 KG/M2 | DIASTOLIC BLOOD PRESSURE: 79 MMHG | TEMPERATURE: 97.8 F | SYSTOLIC BLOOD PRESSURE: 138 MMHG | HEART RATE: 96 BPM | OXYGEN SATURATION: 99 %

## 2025-02-10 DIAGNOSIS — R05.1 ACUTE COUGH: ICD-10-CM

## 2025-02-10 DIAGNOSIS — J18.9 COMMUNITY ACQUIRED PNEUMONIA OF LEFT LOWER LOBE OF LUNG: Primary | ICD-10-CM

## 2025-02-10 DIAGNOSIS — R06.02 SHORTNESS OF BREATH: ICD-10-CM

## 2025-02-10 PROCEDURE — 99214 OFFICE O/P EST MOD 30 MIN: CPT | Performed by: NURSE PRACTITIONER

## 2025-02-10 PROCEDURE — 71046 X-RAY EXAM CHEST 2 VIEWS: CPT | Mod: TC | Performed by: RADIOLOGY

## 2025-02-10 RX ORDER — AZITHROMYCIN 250 MG/1
TABLET, FILM COATED ORAL
Qty: 6 TABLET | Refills: 0 | Status: SHIPPED | OUTPATIENT
Start: 2025-02-10 | End: 2025-02-15

## 2025-02-10 RX ORDER — CEFDINIR 300 MG/1
300 CAPSULE ORAL 2 TIMES DAILY
Qty: 14 CAPSULE | Refills: 0 | Status: SHIPPED | OUTPATIENT
Start: 2025-02-10 | End: 2025-02-17

## 2025-02-10 NOTE — PROGRESS NOTES
Assessment & Plan     Community acquired pneumonia of left lower lobe of lung  Presents for follow-up after influenza A 1 week ago with persistent cough, new chest pressure and intermittent shortness of breath.  Rales noted in the left upper and lower lobe, chest x-ray pursued and on my review appears unchanged from chest x-ray from 2/4/2025 without signs of pneumonia but given presentation and lung sounds, as well as history of pneumonia, recommend treating with cefdinir and azithromycin as below.  She has allergy to Augmentin and fluoroquinolones.  Recommend she continue with albuterol inhaler and pushing fluids, honey for cough.  She declines Tessalon Perles.  Mucinex was not overly helpful.  Recommend follow-up if symptoms not improving over the next 48 to 72 hours or for new or concerning symptoms.    Encouraged she discuss increased respiratory infections with rheumatology, whom she sees for sjogrens.  - cefdinir (OMNICEF) 300 MG capsule; Take 1 capsule (300 mg) by mouth 2 times daily for 7 days.  - azithromycin (ZITHROMAX) 250 MG tablet; Take 2 tablets (500 mg) by mouth daily for 1 day, THEN 1 tablet (250 mg) daily for 4 days.    Acute cough  - XR Chest 2 Views; Future    Shortness of breath  - XR Chest 2 Views; Future                Subjective   Nani is a 59 year old, presenting for the following health issues:  URI (Follow up for Influenza-was seen on 2/4/25.Finished Prednisone Saturday. Still having cough, chest pressure, has SOB at times, very fatigued/exhausted-feels like she needs to lay down after taking a shower. )      2/10/2025    12:48 PM   Additional Questions   Roomed by Kita JAY   Accompanied by Self     Nani presents today for follow-up for influenza.  She tested positive for influenza A on 2/4/2025, 6 days ago.  States her fevers broke and but she continues with cough and seems to have settled on the left side.  She is feeling very fatigued and exhausted.  Chest x-ray on 2/4/2025 was  "negative for pneumonia.  She completed course of prednisone.  Continues on albuterol inhaler.  Feels Mucinex caused excessive secretions/phlegm so this was stopped.  Feels like everything is in her chest.      History of Present Illness       Reason for visit:  High fever, body aches, influenza symptoms, now cough  Symptom onset:  3-7 days ago  Symptoms include:  High fever started last week Friday, temps to 103-104, body aches, chills, head congestion, runny nose, fatigue, dizziness at times, now have developed a deep feeling cough  Symptom intensity:  Moderate  Symptom progression:  Staying the same  Had these symptoms before:  Yes  Has tried/received treatment for these symptoms:  Yes  Previous treatment was successful:  Yes  Prior treatment description:  Last April ( I believe ) I had influenza with pneumonia, I am afraid of this again.  What makes it worse:  No  What makes it better:  Tylenol/ motrin help reliever fever for a little while - been doing this since Friday.  I just started my inhaler today that I have for cough prn   She is taking medications regularly.                 Review of Systems  Constitutional, HEENT, cardiovascular, pulmonary, gi and gu systems are negative, except as otherwise noted.      Objective    /79 (BP Location: Right arm, Patient Position: Sitting, Cuff Size: Adult Large)   Pulse 96   Temp 97.8  F (36.6  C)   Resp 16   Ht 1.702 m (5' 7\")   Wt 66.8 kg (147 lb 3.2 oz)   LMP  (LMP Unknown)   SpO2 99%   BMI 23.05 kg/m    Body mass index is 23.05 kg/m .  Physical Exam  Constitutional:       Appearance: She is ill-appearing.   HENT:      Head: Normocephalic and atraumatic.   Cardiovascular:      Rate and Rhythm: Normal rate and regular rhythm.   Pulmonary:      Breath sounds: Wheezing (bases) and rales (left upper and lower lobe) present.   Neurological:      Mental Status: She is alert and oriented to person, place, and time.   Psychiatric:         Mood and Affect: Mood " normal.         Behavior: Behavior normal.                    Signed Electronically by: ENE Van CNP

## 2025-02-27 ENCOUNTER — TELEPHONE (OUTPATIENT)
Dept: FAMILY MEDICINE | Facility: CLINIC | Age: 60
End: 2025-02-27
Payer: COMMERCIAL

## 2025-02-27 DIAGNOSIS — Z13.6 SCREENING FOR CARDIOVASCULAR CONDITION: Primary | ICD-10-CM

## 2025-02-27 DIAGNOSIS — E06.3 HASHIMOTO'S THYROIDITIS: ICD-10-CM

## 2025-02-27 NOTE — TELEPHONE ENCOUNTER
Order/Referral Request    Who is requesting: Patient     Orders being requested: Annual Exam labs, thyroid and vitamin D    Reason service is needed/diagnosis:     When are orders needed by:    Has this been discussed with Provider:     Does patient have a preference on a Group/Provider/Facility?     Does patient have an appointment scheduled?: 05-12-25    Where to send orders: Place orders within Epic    Could we send this information to you in [employer] or would you prefer to receive a phone call?:   Patient would like to be contacted via [employer] Please sent pt message via [employer]

## 2025-03-01 NOTE — TELEPHONE ENCOUNTER
Please let patient know that I ordered the thyroid level along with cholesterol and vitamin D, but please let her know that insurance companies do not always cover vitamin D levels and they can be several hundred dollars. Would recommend that she check and let us know if she wants me to cancel that lab level.    Thanks

## 2025-03-03 NOTE — TELEPHONE ENCOUNTER
I called and spoke with patient, relayed documentation, patient verbalized understanding, no furtherquestions/concerns at this time.

## 2025-04-29 ENCOUNTER — TRANSFERRED RECORDS (OUTPATIENT)
Dept: HEALTH INFORMATION MANAGEMENT | Facility: CLINIC | Age: 60
End: 2025-04-29
Payer: COMMERCIAL

## 2025-05-12 ENCOUNTER — RESULTS FOLLOW-UP (OUTPATIENT)
Dept: FAMILY MEDICINE | Facility: CLINIC | Age: 60
End: 2025-05-12

## 2025-05-12 ENCOUNTER — LAB (OUTPATIENT)
Dept: LAB | Facility: CLINIC | Age: 60
End: 2025-05-12
Payer: COMMERCIAL

## 2025-05-12 DIAGNOSIS — Z13.6 SCREENING FOR CARDIOVASCULAR CONDITION: ICD-10-CM

## 2025-05-12 DIAGNOSIS — E06.3 HASHIMOTO'S THYROIDITIS: ICD-10-CM

## 2025-05-12 LAB
CHOLEST SERPL-MCNC: 181 MG/DL
FASTING STATUS PATIENT QL REPORTED: YES
HDLC SERPL-MCNC: 63 MG/DL
LDLC SERPL CALC-MCNC: 100 MG/DL
NONHDLC SERPL-MCNC: 118 MG/DL
TRIGL SERPL-MCNC: 88 MG/DL
TSH SERPL DL<=0.005 MIU/L-ACNC: 2.07 UIU/ML (ref 0.3–4.2)
VIT D+METAB SERPL-MCNC: 41 NG/ML (ref 20–50)

## 2025-05-12 PROCEDURE — 80061 LIPID PANEL: CPT

## 2025-05-12 PROCEDURE — 36415 COLL VENOUS BLD VENIPUNCTURE: CPT

## 2025-05-12 PROCEDURE — 84443 ASSAY THYROID STIM HORMONE: CPT

## 2025-05-12 PROCEDURE — 82306 VITAMIN D 25 HYDROXY: CPT

## 2025-05-17 SDOH — HEALTH STABILITY: PHYSICAL HEALTH: ON AVERAGE, HOW MANY MINUTES DO YOU ENGAGE IN EXERCISE AT THIS LEVEL?: 30 MIN

## 2025-05-17 SDOH — HEALTH STABILITY: PHYSICAL HEALTH: ON AVERAGE, HOW MANY DAYS PER WEEK DO YOU ENGAGE IN MODERATE TO STRENUOUS EXERCISE (LIKE A BRISK WALK)?: 3 DAYS

## 2025-05-17 ASSESSMENT — ANXIETY QUESTIONNAIRES
2. NOT BEING ABLE TO STOP OR CONTROL WORRYING: SEVERAL DAYS
1. FEELING NERVOUS, ANXIOUS, OR ON EDGE: SEVERAL DAYS
7. FEELING AFRAID AS IF SOMETHING AWFUL MIGHT HAPPEN: NOT AT ALL
IF YOU CHECKED OFF ANY PROBLEMS ON THIS QUESTIONNAIRE, HOW DIFFICULT HAVE THESE PROBLEMS MADE IT FOR YOU TO DO YOUR WORK, TAKE CARE OF THINGS AT HOME, OR GET ALONG WITH OTHER PEOPLE: NOT DIFFICULT AT ALL
4. TROUBLE RELAXING: SEVERAL DAYS
3. WORRYING TOO MUCH ABOUT DIFFERENT THINGS: SEVERAL DAYS
7. FEELING AFRAID AS IF SOMETHING AWFUL MIGHT HAPPEN: NOT AT ALL
8. IF YOU CHECKED OFF ANY PROBLEMS, HOW DIFFICULT HAVE THESE MADE IT FOR YOU TO DO YOUR WORK, TAKE CARE OF THINGS AT HOME, OR GET ALONG WITH OTHER PEOPLE?: NOT DIFFICULT AT ALL
GAD7 TOTAL SCORE: 4
6. BECOMING EASILY ANNOYED OR IRRITABLE: NOT AT ALL
5. BEING SO RESTLESS THAT IT IS HARD TO SIT STILL: NOT AT ALL

## 2025-05-17 ASSESSMENT — PATIENT HEALTH QUESTIONNAIRE - PHQ9
SUM OF ALL RESPONSES TO PHQ QUESTIONS 1-9: 3
SUM OF ALL RESPONSES TO PHQ QUESTIONS 1-9: 3
10. IF YOU CHECKED OFF ANY PROBLEMS, HOW DIFFICULT HAVE THESE PROBLEMS MADE IT FOR YOU TO DO YOUR WORK, TAKE CARE OF THINGS AT HOME, OR GET ALONG WITH OTHER PEOPLE: NOT DIFFICULT AT ALL

## 2025-05-17 ASSESSMENT — SOCIAL DETERMINANTS OF HEALTH (SDOH): HOW OFTEN DO YOU GET TOGETHER WITH FRIENDS OR RELATIVES?: THREE TIMES A WEEK

## 2025-05-19 ENCOUNTER — OFFICE VISIT (OUTPATIENT)
Dept: FAMILY MEDICINE | Facility: CLINIC | Age: 60
End: 2025-05-19
Attending: FAMILY MEDICINE
Payer: COMMERCIAL

## 2025-05-19 VITALS
RESPIRATION RATE: 10 BRPM | HEART RATE: 78 BPM | SYSTOLIC BLOOD PRESSURE: 118 MMHG | HEIGHT: 67 IN | WEIGHT: 149.1 LBS | BODY MASS INDEX: 23.4 KG/M2 | OXYGEN SATURATION: 98 % | TEMPERATURE: 98.8 F | DIASTOLIC BLOOD PRESSURE: 82 MMHG

## 2025-05-19 DIAGNOSIS — K29.50 CHRONIC GASTRITIS WITHOUT BLEEDING, UNSPECIFIED GASTRITIS TYPE: ICD-10-CM

## 2025-05-19 DIAGNOSIS — J45.20 MILD INTERMITTENT ASTHMA WITHOUT COMPLICATION: ICD-10-CM

## 2025-05-19 DIAGNOSIS — F41.8 MIXED ANXIETY AND DEPRESSIVE DISORDER: ICD-10-CM

## 2025-05-19 DIAGNOSIS — E06.3 HASHIMOTO'S THYROIDITIS: ICD-10-CM

## 2025-05-19 DIAGNOSIS — Z00.00 ROUTINE GENERAL MEDICAL EXAMINATION AT A HEALTH CARE FACILITY: Primary | ICD-10-CM

## 2025-05-19 DIAGNOSIS — N30.90 RECURRENT CYSTITIS WITH NEGATIVE CULTURE: ICD-10-CM

## 2025-05-19 PROCEDURE — 3079F DIAST BP 80-89 MM HG: CPT | Performed by: FAMILY MEDICINE

## 2025-05-19 PROCEDURE — 3074F SYST BP LT 130 MM HG: CPT | Performed by: FAMILY MEDICINE

## 2025-05-19 PROCEDURE — 99396 PREV VISIT EST AGE 40-64: CPT | Performed by: FAMILY MEDICINE

## 2025-05-19 PROCEDURE — 99214 OFFICE O/P EST MOD 30 MIN: CPT | Mod: 25 | Performed by: FAMILY MEDICINE

## 2025-05-19 RX ORDER — LEVALBUTEROL TARTRATE 45 UG/1
2 AEROSOL, METERED ORAL EVERY 4 HOURS PRN
Qty: 15 G | Refills: 11 | Status: SHIPPED | OUTPATIENT
Start: 2025-05-19

## 2025-05-19 RX ORDER — OMEPRAZOLE 20 MG/1
20 CAPSULE, DELAYED RELEASE ORAL DAILY
Qty: 90 CAPSULE | Refills: 4 | Status: SHIPPED | OUTPATIENT
Start: 2025-05-19

## 2025-05-19 RX ORDER — PREDNISONE 20 MG/1
20 TABLET ORAL DAILY
Qty: 5 TABLET | Refills: 1 | Status: SHIPPED | OUTPATIENT
Start: 2025-05-19

## 2025-05-19 RX ORDER — CHOLECALCIFEROL (VITAMIN D3) 50 MCG
1 TABLET ORAL DAILY
COMMUNITY

## 2025-05-19 RX ORDER — LEVOTHYROXINE SODIUM 112 UG/1
112 TABLET ORAL DAILY
Qty: 90 TABLET | Refills: 4 | Status: SHIPPED | OUTPATIENT
Start: 2025-05-19

## 2025-05-19 ASSESSMENT — ASTHMA QUESTIONNAIRES: ACT_TOTALSCORE: 25

## 2025-05-19 NOTE — PATIENT INSTRUCTIONS
Patient Education   Preventive Care Advice   This is general advice given by our system to help you stay healthy. However, your care team may have specific advice just for you. Please talk to your care team about your preventive care needs.  Nutrition  Eat 5 or more servings of fruits and vegetables each day.  Try wheat bread, brown rice and whole grain pasta (instead of white bread, rice, and pasta).  Get enough calcium and vitamin D. Check the label on foods and aim for 100% of the RDA (recommended daily allowance).  Lifestyle  Exercise at least 150 minutes each week  (30 minutes a day, 5 days a week).  Do muscle strengthening activities 2 days a week. These help control your weight and prevent disease.  No smoking.  Wear sunscreen to prevent skin cancer.  Have a dental exam and cleaning every 6 months.  Yearly exams  See your health care team every year to talk about:  Any changes in your health.  Any medicines your care team has prescribed.  Preventive care, family planning, and ways to prevent chronic diseases.  Shots (vaccines)   HPV shots (up to age 26), if you've never had them before.  Hepatitis B shots (up to age 59), if you've never had them before.  COVID-19 shot: Get this shot when it's due.  Flu shot: Get a flu shot every year.  Tetanus shot: Get a tetanus shot every 10 years.  Pneumococcal, hepatitis A, and RSV shots: Ask your care team if you need these based on your risk.  Shingles shot (for age 50 and up)  General health tests  Diabetes screening:  Starting at age 35, Get screened for diabetes at least every 3 years.  If you are younger than age 35, ask your care team if you should be screened for diabetes.  Cholesterol test: At age 39, start having a cholesterol test every 5 years, or more often if advised.  Bone density scan (DEXA): At age 50, ask your care team if you should have this scan for osteoporosis (brittle bones).  Hepatitis C: Get tested at least once in your life.  STIs (sexually  transmitted infections)  Before age 24: Ask your care team if you should be screened for STIs.  After age 24: Get screened for STIs if you're at risk. You are at risk for STIs (including HIV) if:  You are sexually active with more than one person.  You don't use condoms every time.  You or a partner was diagnosed with a sexually transmitted infection.  If you are at risk for HIV, ask about PrEP medicine to prevent HIV.  Get tested for HIV at least once in your life, whether you are at risk for HIV or not.  Cancer screening tests  Cervical cancer screening: If you have a cervix, begin getting regular cervical cancer screening tests starting at age 21.  Breast cancer scan (mammogram): If you've ever had breasts, begin having regular mammograms starting at age 40. This is a scan to check for breast cancer.  Colon cancer screening: It is important to start screening for colon cancer at age 45.  Have a colonoscopy test every 10 years (or more often if you're at risk) Or, ask your provider about stool tests like a FIT test every year or Cologuard test every 3 years.  To learn more about your testing options, visit:   .  For help making a decision, visit:   https://bit.ly/ur11747.  Prostate cancer screening test: If you have a prostate, ask your care team if a prostate cancer screening test (PSA) at age 55 is right for you.  Lung cancer screening: If you are a current or former smoker ages 50 to 80, ask your care team if ongoing lung cancer screenings are right for you.  For informational purposes only. Not to replace the advice of your health care provider. Copyright   2023 [facility]. All rights reserved. Clinically reviewed by the [facility] Transitions Program. [employer] [id number] - REV 01/24.  Learning About Stress  What is stress?     Stress is your body's response to a hard situation. Your body can have a physical, emotional, or mental response. Stress is a fact of life for most people, and it  affects everyone differently. What causes stress for you may not be stressful for someone else.  A lot of things can cause stress. You may feel stress when you go on a job interview, take a test, or run a race. This kind of short-term stress is normal and even useful. It can help you if you need to work hard or react quickly. For example, stress can help you finish an important job on time.  Long-term stress is caused by ongoing stressful situations or events. Examples of long-term stress include long-term health problems, ongoing problems at work, or conflicts in your family. Long-term stress can harm your health.  How does stress affect your health?  When you are stressed, your body responds as though you are in danger. It makes hormones that speed up your heart, make you breathe faster, and give you a burst of energy. This is called the fight-or-flight stress response. If the stress is over quickly, your body goes back to normal and no harm is done.  But if stress happens too often or lasts too long, it can have bad effects. Long-term stress can make you more likely to get sick, and it can make symptoms of some diseases worse. If you tense up when you are stressed, you may develop neck, shoulder, or low back pain. Stress is linked to high blood pressure and heart disease.  Stress also harms your emotional health. It can make you agarwal, tense, or depressed. Your relationships may suffer, and you may not do well at work or school.  What can you do to manage stress?  You can try these things to help manage stress:   Do something active. Exercise or activity can help reduce stress. Walking is a great way to get started. Even everyday activities such as housecleaning or yard work can help.  Try yoga or sunny chi. These techniques combine exercise and meditation. You may need some training at first to learn them.  Do something you enjoy. For example, listen to music or go to a movie. Practice your hobby or do volunteer  "work.  Meditate. This can help you relax, because you are not worrying about what happened before or what may happen in the future.  Do guided imagery. Imagine yourself in any setting that helps you feel calm. You can use online videos, books, or a teacher to guide you.  Do breathing exercises. For example:  From a standing position, bend forward from the waist with your knees slightly bent. Let your arms dangle close to the floor.  Breathe in slowly and deeply as you return to a standing position. Roll up slowly and lift your head last.  Hold your breath for just a few seconds in the standing position.  Breathe out slowly and bend forward from the waist.  Let your feelings out. Talk, laugh, cry, and express anger when you need to. Talking with supportive friends or family, a counselor, or a dk leader about your feelings is a healthy way to relieve stress. Avoid discussing your feelings with people who make you feel worse.  Write. It may help to write about things that are bothering you. This helps you find out how much stress you feel and what is causing it. When you know this, you can find better ways to cope.  What can you do to prevent stress?  You might try some of these things to help prevent stress:  Manage your time. This helps you find time to do the things you want and need to do.  Get enough sleep. Your body recovers from the stresses of the day while you are sleeping.  Get support. Your family, friends, and community can make a difference in how you experience stress.  Limit your news feed. Avoid or limit time on social media or news that may make you feel stressed.  Do something active. Exercise or activity can help reduce stress. Walking is a great way to get started.  Where can you learn more?  Go to https://www.[employer].net/patiented  Enter N032 in the search box to learn more about \"Learning About Stress.\"  Current as of: October 24, 2024  Content Version: 14.4 2024-2025 [employer].   Care instructions adapted under license by your healthcare professional. If you have questions about a medical condition or this instruction, always ask your healthcare professional. [email or web address] disclaims any warranty or liability for your use of this information.

## 2025-05-19 NOTE — PROGRESS NOTES
Preventive Care Visit  [facility]  Dom Novoa MD, Family Medicine  May 19, 2025      Assessment & Plan     Assessment & Plan  Routine general medical examination at a health care facility  -Health maintenance updated, preventive services reviewed, vaccines discussed, questions are answered, patient encouraged to continue annual wellness visits to review preventive services    Hashimoto's thyroiditis  - Thyroid results are normal.  - Refill levothyroxine 112 micrograms daily.    Chronic gastritis without bleeding, unspecified gastritis type  - Refill Prilosec.  Stable without symptoms    Mixed anxiety and depressive disorder  - Mixed anxiety and depression disorder linked to adjustment disorder from childhood issues and family stressors. Prozac is helpful.  No longer following with psychiatry, will manage medications here  - Refill Prozac 20 mg daily.    Mild intermittent asthma without complication  - Mild intermittent asthma suspected due to respiratory infections and environmental allergies, leading to wheezing and tightness. Symptoms are currently controlled.  - Refill levalbuterol (Xopenex) with plenty of refills available for the next year. Consider Advair or prednisone burst if symptoms worsen. Prednisone refill on file for use during respiratory infections.    Recurrent cystitis with negative culture  - Recurrent cystitis with negative cultures possibly related to age or yeast infection post-antibiotics.  - Push fluids for a few days and use over-the-counter Pyridium (Azo) if symptoms arise. Standing order for urinalysis available upon request. Consider urogynecology consult if symptoms persist. Option to try topical estrogen vaginal cream for 4 to 6 weeks if symptoms continue.    Prescription  - Levothyroxine 112 micrograms, daily  - Prozac 20 mg, daily  - Levalbuterol (Xopenex), refills available for use as needed  - Prednisone, refill on file for use during respiratory  infections  - Prilosec, refills available    Appointments  - Colonoscopy appointment to be scheduled.  - Follow-up appointment to discuss lab results and any further testing if needed.    Consent was obtained from the patient to use an AI documentation tool in the creation of this note.            Counseling  Appropriate preventive services were addressed with this patient via screening, questionnaire, or discussion as appropriate for fall prevention, nutrition, physical activity, Tobacco-use cessation, social engagement, weight loss and cognition.  Checklist reviewing preventive services available has been given to the patient.  Reviewed patient's diet, addressing concerns and/or questions.   She is at risk for lack of exercise and has been provided with information to increase physical activity for the benefit of her well-being.   She is at risk for psychosocial distress and has been provided with information to reduce risk.           Cruzito Cardoza is a 59 year old, presenting for the following:  Physical and Breathing Problem (Concerns of repeat upper respiratory infections - )        5/19/2025     8:12 AM   Additional Questions   Roomed by Katheryn ART CMA   Accompanied by None        History of Present Illness-  [place], age not mentioned    Adjustment Disorder with Mixed Anxiety and Depression  - Experiences feelings of being down during winter months, as advised by therapist to check vitamin D levels  - Takes Vitamin D supplement, 2000 IU, which maintains levels in the right range  - Previously attempted to discontinue Prozac during winter, resulting in poor mental health  - Currently taking Prozac 20 mg daily, initially prescribed by psychiatrist for mixed anxiety and depression disorder  - Continues to see a counselor, not currently seeing a psychiatrist but has the option to return if needed    Respiratory Infections and Wheezing  - Experiences severe respiratory infections that lead to chest  congestion and wheezing  - Uses levalbuterol inhaler during respiratory infections, which helps with cough and tightness  - Last significant respiratory illness occurred in February 2025, with a prednisone burst for bronchitis in late fall 2024  - Wears a mask while mowing the lawn to prevent respiratory issues  - No recent symptoms of shortness of breath, nighttime symptoms, or need for Zopenex since February 2025    Urinary Symptoms  - Experiences symptoms similar to a UTI, but tests have been negative  - One episode related to a yeast infection after antibiotics in February 2025  - Symptoms clear up within a few days  - Has Azo at home for symptom relief  - Previous gynecologist recommended seeing a urologist, but had a negative experience with a specific urologist  - Has not used prescribed vaginal cream from gynecologist    Heart Palpitations  - Occasional sensation of heart palpitations  - No associated symptoms except for a brief sensation in the throat  - Occurs frequently enough to be noticeable, but not during physical activity  - No symptoms lasting more than a few seconds         Advance Care Planning    Patient states has Health Care Directive and will send to [facility].        5/17/2025   General Health   How would you rate your overall physical health? Good   Feel stress (tense, anxious, or unable to sleep) To some extent   (!) STRESS CONCERN      5/17/2025   Nutrition   Three or more servings of calcium each day? Yes   Diet: Regular (no restrictions)   How many servings of fruit and vegetables per day? (!) 2-3   How many sweetened beverages each day? 0-1         5/17/2025   Exercise   Days per week of moderate/strenous exercise 3 days   Average minutes spent exercising at this level 30 min         5/17/2025   Social Factors   Frequency of gathering with friends or relatives Three times a week   Worry food won't last until get money to buy more No   Food not last or not have enough money for  food? No   Do you have housing? (Housing is defined as stable permanent housing and does not include staying outside in a car, in a tent, in an abandoned building, in an overnight shelter, or couch-surfing.) Yes   Are you worried about losing your housing? No   Lack of transportation? No   Unable to get utilities (heat,electricity)? No         5/17/2025   Fall Risk   Fallen 2 or more times in the past year? No   Trouble with walking or balance? No          5/17/2025   Dental   Dentist two times every year? Yes       Today's PHQ-9 Score:       5/17/2025     8:45 PM   PHQ-9 SCORE   PHQ-9 Total Score MyChart 3 (Minimal depression)   PHQ-9 Total Score 3        Patient-reported         5/17/2025   Substance Use   Alcohol more than 3/day or more than 7/wk No   Do you use any other substances recreationally? No     Social History     Tobacco Use    Smoking status: Never     Passive exposure: Past    Smokeless tobacco: Never   Vaping Use    Vaping status: Never Used   Substance Use Topics    Alcohol use: Yes     Comment: occasional    Drug use: No                  5/17/2025   STI Screening   New sexual partner(s) since last STI/HIV test? No     History of abnormal Pap smear: No - age 30- 64 PAP with HPV every 5 years recommended        Latest Ref Rng & Units 6/21/2023     3:55 PM 6/13/2022     8:40 AM   PAP / HPV   PAP  Negative for Intraepithelial Lesion or Malignancy (NILM)  Negative for Intraepithelial Lesion or Malignancy (NILM)    HPV 16 DNA Negative  Negative    HPV 18 DNA Negative  Negative    Other HR HPV Negative  Negative      ASCVD Risk   The 10-year ASCVD risk score (Vinny SARGENT, et al., 2019) is: 2.1%    Values used to calculate the score:      Age: 59 years      Sex: Female      Is Non- : No      Diabetic: No      Tobacco smoker: No      Systolic Blood Pressure: 118 mmHg      Is BP treated: No      HDL Cholesterol: 63 mg/dL      Total Cholesterol: 181 mg/dL           Reviewed and  "updated as needed this visit by Provider   Tobacco  Allergies  Meds  Problems  Med Hx  Surg Hx  Fam Hx                     Objective    Exam  /82   Pulse 78   Temp 98.8  F (37.1  C)   Resp 10   Ht 1.693 m (5' 6.65\")   Wt 67.6 kg (149 lb 1.6 oz)   LMP  (LMP Unknown)   SpO2 98%   BMI 23.60 kg/m     Estimated body mass index is 23.6 kg/m  as calculated from the following:    Height as of this encounter: 1.693 m (5' 6.65\").    Weight as of this encounter: 67.6 kg (149 lb 1.6 oz).    Physical Exam  GENERAL: alert and no distress  EYES: Eyes grossly normal to inspection, PERRL and conjunctivae and sclerae normal  HENT: ear canals and TM's normal, nose and mouth without ulcers or lesions  NECK: no adenopathy, no asymmetry, masses, or scars  RESP: lungs clear to auscultation - no rales, rhonchi or wheezes  CV: regular rate and rhythm, normal S1 S2, no S3 or S4, no murmur, click or rub, no peripheral edema  MS: no gross musculoskeletal defects noted, no edema  SKIN: no suspicious lesions or rashes  NEURO: Normal strength and tone, mentation intact and speech normal  PSYCH: mentation appears normal, affect normal/bright    Answers submitted by the patient for this visit:  Preventative Adult Visit on 5/19/2025  8:15 AM with Dom Novoa  Patient Health Questionnaire (Submitted on 5/17/2025)  If you checked off any problems, how difficult have these problems made it for you to do your work, take care of things at home, or get along with other people?: Not difficult at all  PHQ9 TOTAL SCORE: 3  Patient Health Questionnaire (G7) (Submitted on 5/17/2025)  NOELLE 7 TOTAL SCORE: 4      Signed Electronically by: Dom Novoa MD    "

## 2025-05-22 ENCOUNTER — LAB (OUTPATIENT)
Dept: LAB | Facility: CLINIC | Age: 60
End: 2025-05-22
Payer: COMMERCIAL

## 2025-05-22 ENCOUNTER — E-VISIT (OUTPATIENT)
Dept: FAMILY MEDICINE | Facility: CLINIC | Age: 60
End: 2025-05-22
Payer: COMMERCIAL

## 2025-05-22 DIAGNOSIS — N30.90 RECURRENT CYSTITIS WITH NEGATIVE CULTURE: ICD-10-CM

## 2025-05-22 DIAGNOSIS — N39.0 ACUTE UTI (URINARY TRACT INFECTION): Primary | ICD-10-CM

## 2025-05-22 LAB
ALBUMIN UR-MCNC: NEGATIVE MG/DL
APPEARANCE UR: CLEAR
BACTERIA #/AREA URNS HPF: ABNORMAL /HPF
BILIRUB UR QL STRIP: NEGATIVE
COLOR UR AUTO: YELLOW
GLUCOSE UR STRIP-MCNC: NEGATIVE MG/DL
HGB UR QL STRIP: ABNORMAL
KETONES UR STRIP-MCNC: NEGATIVE MG/DL
LEUKOCYTE ESTERASE UR QL STRIP: ABNORMAL
MUCOUS THREADS #/AREA URNS LPF: PRESENT /LPF
NITRATE UR QL: NEGATIVE
PH UR STRIP: 5.5 [PH] (ref 5–7)
RBC #/AREA URNS AUTO: ABNORMAL /HPF
SP GR UR STRIP: <=1.005 (ref 1–1.03)
SQUAMOUS #/AREA URNS AUTO: ABNORMAL /LPF
UROBILINOGEN UR STRIP-ACNC: 0.2 E.U./DL
WBC #/AREA URNS AUTO: >100 /HPF
WBC CLUMPS #/AREA URNS HPF: PRESENT /HPF

## 2025-05-22 RX ORDER — NITROFURANTOIN 25; 75 MG/1; MG/1
100 CAPSULE ORAL 2 TIMES DAILY
Qty: 10 CAPSULE | Refills: 0 | Status: SHIPPED | OUTPATIENT
Start: 2025-05-22 | End: 2025-05-27

## 2025-05-22 NOTE — PATIENT INSTRUCTIONS
Dear Nani Gibson    After reviewing your responses, I've been able to diagnose you with a urinary tract infection, which is a common infection of the bladder with bacteria.  This is not a sexually transmitted infection, though urinating immediately after intercourse can help prevent infections.  Drinking lots of fluids is also helpful to clear your current infection and prevent the next one.      Your urine test was abnormal and I sent in the nitrofurantoin.    I have sent a prescription for antibiotics to your pharmacy to treat this infection.    It is important that you take all of your prescribed medication even if your symptoms are improving after a few doses.  Taking all of your medicine helps prevent the symptoms from returning.     If your symptoms worsen, you develop pain in your back or stomach, develop fevers, or are not improving in 5 days, please contact your primary care provider for an appointment or visit any of our convenient Walk-in or Urgent Care Centers to be seen, which can be found on our website here.    Thanks again for choosing us as your health care partner,    Dom Novoa MD

## 2025-05-24 LAB — BACTERIA UR CULT: ABNORMAL

## 2025-05-28 ENCOUNTER — RESULTS FOLLOW-UP (OUTPATIENT)
Dept: FAMILY MEDICINE | Facility: CLINIC | Age: 60
End: 2025-05-28

## 2025-05-28 NOTE — TELEPHONE ENCOUNTER
RN spoke with patient and gave results per provider.   Patient reports that her symptoms have resolved - she will monitor for any recurrent symptoms and follow up if she has any concerns.

## 2025-06-23 ENCOUNTER — TRANSFERRED RECORDS (OUTPATIENT)
Dept: ADMINISTRATIVE | Facility: CLINIC | Age: 60
End: 2025-06-23
Payer: COMMERCIAL

## 2025-06-25 PROBLEM — D12.6 ADENOMATOUS POLYP OF COLON: Status: ACTIVE | Noted: 2025-06-25

## 2025-06-25 NOTE — PROCEDURES
[facility]   [address] [place] [address], [address], [place] [address]     Patient Name: Nani Gibson  Gender:  Female  Exam Date: 06/23/2025 Visit Number:  [id number]  Age: 59 Years YOB: 1965  Attending MD: Jayne Harvey MD Medical Record#:  [id number]    Procedure: Colonoscopy   Indications: Previous adenomatous polyp(s)      Referring MD: Referral Self  Primary MD:      Dom Novoa MD  Medications: Admitting Medications:   0.9% Normal Saline at TK   Intra Procedure Medications:   Patient received monitored anesthesia care.     Complications: No immediate complications  ______________________________________________________________________________  Procedure:   An examination of the heart and lungs was performed and found to be within acceptable limits.  .  The patient was therefore deemed a reasonable candidate for endoscopy and sedation.   The risks and benefits of the procedure were explained to the patient.After obtaining informed consent, the patient received monitored anesthesia care and I passed the scope   without difficulty via the rectum to the cecum.  The appendiceal orifice and ic valve were identified.  The scope was retroflexed during the examination  The quality of the prep was good  (Miralax/Gatorade/2 tablets Bisacodyl/Magnesium Citrate).    This was a complete examination throughout the entire colon.    Findings:    Polyp location: transverse colon.  Quantity: 3.  Size:  2 mm.  Polyp shape:  sessile.         Maneuver: polypectomy was performed with a cold biopsy forceps.       Removal:  complete.  Retrieval: complete.  Bleeding: none.    Long and redundant colon.    Hemorrhoids.  External hemorrhoids without bleeding.  Remainder of the exam is normal.    Impression:  Colorectal polyps  Personal history of colonic polyps  Hemorrhoids, external    Preliminary Plan:  The patient and their physician will receive a copy of the pathology report as well as  pathology-based recommendations for future screening or surveillance.    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     Complications: No immediate complications  ___________________________________________________________________________________________  Procedure:   An examination of the heart and lungs was performed within acceptable limits.  . The patient was therefore deemed a reasonable candidate for sedation.   The risks and benefits were explained to the patient, who appeared to understand. After obtaining informed consent, the scope was passed under direct vision. Throughout the procedure the patient's blood pressure, pulse and oxygen saturations were monitored.  The scope was introduced through the mouth and advanced to the second portion of duodenum.         Findings:   Esophagus:    Normal esophagus.   The z-line is 39 centimeters from the incisors.  Top of the gastric folds is 39 centimeters from the incisors.  Stomach:  The diaphragm hiatus is at 39 centimeters from the incisors.  *Stomach Comments:  Moderate gastric erythema throughout stomach.  Random biopsies taken from the antrum and body and fundus.  Duodenum:    Normal duodenum.  Impression:   Gastropathy    Preliminary Plan:  Recommendation Comments:  Minimize NSAID use.  Pathology Results:  A: STOMACH, BIOPSY:           1. Mild non-specific chronic inflammation (see comment)               a. Sampling: Antral and body mucosae               b. Distribution: Antral and body mucosae           2. Negative for atrophic gastritis           3. Negative for Helicobacter (immunohistochemistry reviewed)      B: COLON,  TRANSVERSE, POLYPS:           1. Tubular adenomas (3)           2. Negative for high grade dysplasia           3. Per the colonoscopy report:               a. Polyp sizes: 2 mm               b. Resection: Complete               c. Retrieval: Complete      COMMENTS  A. Mild chronic inflammation in the stomach in the absence of Helicobacter often remains unexplained, but it could reflect prior or treated Helicobacter infection. The likelihood of histologically undetected Helicobacter is quite low in our opinion.      MICROSCOPIC  A: Performed. Gastrin and chromogranin stains were also performed. The gastrin stain is positive within gastric antral fragments. The chromogranin stain is negative for neuroendocrine cell hyperplasia. These findings support the above final diagnosis.   B: Performed   SPECIAL STAINING/DEEPER  A: IHC Stains: AIG (Helicobacter; Gastrin; Chromogranin)     Electronically signed by: Baylee Trinidad DO    Interpreted at [facility], [address] [place] [id number]    Orders    Instruction(s)/Education:  Instruction/Education Timeframe Assessment   Colon Cancer Prevention  K63.5   Colon Polyps  K63.5   Hemorrhoids (External)  K63.5       Final Plan:  Repeat colonoscopy in 3 years.  Repeat Upper Endoscopy (EGD) in 3 years .    We will attempt to contact you at appropriate intervals via [place] mail.  We may not be able to find you or contact you at that time, therefore you should know that the responsibility for following our recommendation rests with you.  If you don't hear from us at the time your procedure is due, please contact our office to schedule an appointment.  If your contact information should change, please contact our office so that we can update your record.      _Electronically signed by:___________________  Jayne Harvey MD                 06/23/2025    cc: Dom Novoa MD

## 2025-06-26 ENCOUNTER — PATIENT OUTREACH (OUTPATIENT)
Dept: GASTROENTEROLOGY | Facility: CLINIC | Age: 60
End: 2025-06-26
Payer: COMMERCIAL

## 2025-07-28 ENCOUNTER — OFFICE VISIT (OUTPATIENT)
Dept: FAMILY MEDICINE | Facility: CLINIC | Age: 60
End: 2025-07-28
Payer: COMMERCIAL

## 2025-07-28 VITALS
RESPIRATION RATE: 16 BRPM | OXYGEN SATURATION: 99 % | BODY MASS INDEX: 24.32 KG/M2 | DIASTOLIC BLOOD PRESSURE: 82 MMHG | SYSTOLIC BLOOD PRESSURE: 119 MMHG | WEIGHT: 151.3 LBS | HEIGHT: 66 IN | HEART RATE: 89 BPM | TEMPERATURE: 98.5 F

## 2025-07-28 DIAGNOSIS — N89.8 VAGINAL ITCHING: Primary | ICD-10-CM

## 2025-07-28 LAB
CLUE CELLS: ABNORMAL
TRICHOMONAS, WET PREP: ABNORMAL
WBC'S/HIGH POWER FIELD, WET PREP: ABNORMAL
YEAST, WET PREP: ABNORMAL

## 2025-07-28 PROCEDURE — 99213 OFFICE O/P EST LOW 20 MIN: CPT | Performed by: NURSE PRACTITIONER

## 2025-07-28 PROCEDURE — 87210 SMEAR WET MOUNT SALINE/INK: CPT | Mod: QW | Performed by: NURSE PRACTITIONER

## 2025-07-28 PROCEDURE — 3079F DIAST BP 80-89 MM HG: CPT | Performed by: NURSE PRACTITIONER

## 2025-07-28 PROCEDURE — 3074F SYST BP LT 130 MM HG: CPT | Performed by: NURSE PRACTITIONER

## 2025-07-28 NOTE — PROGRESS NOTES
Assessment & Plan     Vaginal itching  Wet prep negative for yeast infection or bacterial vaginosis.  She has history of vaginal itching that improved with fluconazole after 48 to 72 hours but possibly improved on own.  Symptoms returned yesterday after intercourse the day prior.  No vaginal drainage.  On exam there is signs of vaginal dermatitis and mild swelling no discoloration, hypopigmentation that would indicate lichen sclerosis.  Does wear a light pad and we discussed in the hot weather that could contribute to irritation of the vulva.  She has been having more trouble with UTI and yeast infection since postmenopausal.  Today, recommend triamcinolone twice daily for up to 2 weeks for itching and vaginal estrogen for atrophic vaginitis symptoms.  We discussed with starting with a fingertip amount to the vulva and vaginal introitus twice weekly and can decrease to once weekly for maintenance.  Could also consider referral to urogynecologist.  - Wet prep - Clinic Collect        Subjective   Nani is a 59 year old, presenting for the following health issues:  Vaginal Problem (C/o recurring vaginal itch, onset 07/15/2025, did take left over fluconazole.   Recurring sx yesterday.)      7/28/2025     2:27 PM   Additional Questions   Roomed by Sharita FERNANDEZ CMA     7/19 fluconazole helped by Wednesday  Okay all week  But symptoms return yesterday  [place] on Saturday  Itching, not too much drainage  Monistat tried and burned didn't help  Post menopausal x 5 years  Bladder problems since then-most recently treated for UTI in May  No dysuria, hematuria, fever, chills or back pain or flank pain    Vaginal Problem     History of Present Illness       Reason for visit:  Vaginal itching and irritation  Symptom onset:  1-3 days ago  Symptoms include:  Itching and irritation in vaginal area  Symptom intensity:  Mild  Symptom progression:  Worsening  Had these symptoms before:  Yes  Has tried/received treatment for these  "symptoms:  Yes  Previous treatment was successful:  Yes  Prior treatment description:  Fluconazole 1 tablet 10 days ago for same problem - it was worse at that time than today /also tried monistat cream to area - makes itching worse  What makes it worse:  I think the hot weather is a factor  What makes it better:  No   She is taking medications regularly.                Review of Systems  Constitutional, HEENT, cardiovascular, pulmonary, gi and gu systems are negative, except as otherwise noted.      Objective    /82 (BP Location: Right arm, Patient Position: Sitting, Cuff Size: Adult Regular)   Pulse 89   Temp 98.5  F (36.9  C) (Tympanic)   Resp 16   Ht 1.686 m (5' 6.37\")   Wt 68.6 kg (151 lb 4.8 oz)   LMP  (LMP Unknown)   SpO2 99%   BMI 24.15 kg/m    Body mass index is 24.15 kg/m .  Physical Exam   GENERAL: alert and no distress  RESP: lungs clear to auscultation - no rales, rhonchi or wheezes  CV: regular rate and rhythm, normal S1 S2, no S3 or S4, no murmur, click or rub, no peripheral edema  ABDOMEN: soft, nontender, no hepatosplenomegaly, no masses and bowel sounds normal   (female): Mild erythema and swelling of vulva, no drainage. normal urethral meatus, no hypopigmentation or vulvar lesions      Results for orders placed or performed in visit on 07/28/25   Wet prep - Clinic Collect     Status: Abnormal    Specimen: Vagina; Swab   Result Value Ref Range    Trichomonas Absent Absent    Yeast Absent Absent    Clue Cells Absent Absent    WBCs/high power field 4+ (A) None           Signed Electronically by: ENE Van CNP    "

## 2025-08-06 ENCOUNTER — RESULTS FOLLOW-UP (OUTPATIENT)
Dept: FAMILY MEDICINE | Facility: CLINIC | Age: 60
End: 2025-08-06

## 2025-08-06 ENCOUNTER — E-VISIT (OUTPATIENT)
Dept: FAMILY MEDICINE | Facility: CLINIC | Age: 60
End: 2025-08-06
Payer: COMMERCIAL

## 2025-08-06 ENCOUNTER — LAB (OUTPATIENT)
Dept: LAB | Facility: CLINIC | Age: 60
End: 2025-08-06
Payer: COMMERCIAL

## 2025-08-06 DIAGNOSIS — N30.90 RECURRENT CYSTITIS WITH NEGATIVE CULTURE: ICD-10-CM

## 2025-08-06 DIAGNOSIS — N39.0 ACUTE UTI (URINARY TRACT INFECTION): Primary | ICD-10-CM

## 2025-08-06 DIAGNOSIS — N39.0 ACUTE UTI (URINARY TRACT INFECTION): ICD-10-CM

## 2025-08-06 LAB
ALBUMIN UR-MCNC: NEGATIVE MG/DL
APPEARANCE UR: ABNORMAL
BACTERIA #/AREA URNS HPF: ABNORMAL /HPF
BILIRUB UR QL STRIP: NEGATIVE
COLOR UR AUTO: YELLOW
GLUCOSE UR STRIP-MCNC: NEGATIVE MG/DL
HGB UR QL STRIP: ABNORMAL
KETONES UR STRIP-MCNC: NEGATIVE MG/DL
LEUKOCYTE ESTERASE UR QL STRIP: ABNORMAL
NITRATE UR QL: NEGATIVE
PH UR STRIP: 7 [PH] (ref 5–7)
RBC #/AREA URNS AUTO: ABNORMAL /HPF
SP GR UR STRIP: 1.01 (ref 1–1.03)
SQUAMOUS #/AREA URNS AUTO: ABNORMAL /LPF
UROBILINOGEN UR STRIP-ACNC: 0.2 E.U./DL
WBC #/AREA URNS AUTO: >100 /HPF
WBC CLUMPS #/AREA URNS HPF: PRESENT /HPF

## 2025-08-06 PROCEDURE — 99207 PR NON-BILLABLE SERV PER CHARTING: CPT | Performed by: FAMILY MEDICINE

## 2025-08-06 PROCEDURE — 81001 URINALYSIS AUTO W/SCOPE: CPT

## 2025-08-06 PROCEDURE — 87186 SC STD MICRODIL/AGAR DIL: CPT

## 2025-08-06 PROCEDURE — 87086 URINE CULTURE/COLONY COUNT: CPT

## 2025-08-06 RX ORDER — NITROFURANTOIN 25; 75 MG/1; MG/1
100 CAPSULE ORAL 2 TIMES DAILY
Qty: 10 CAPSULE | Refills: 0 | Status: SHIPPED | OUTPATIENT
Start: 2025-08-06

## 2025-08-07 LAB — BACTERIA UR CULT: ABNORMAL
